# Patient Record
Sex: MALE | Race: WHITE | URBAN - METROPOLITAN AREA
[De-identification: names, ages, dates, MRNs, and addresses within clinical notes are randomized per-mention and may not be internally consistent; named-entity substitution may affect disease eponyms.]

---

## 2024-05-24 PROBLEM — I10 ESSENTIAL HYPERTENSION, BENIGN: Status: ACTIVE | Noted: 2018-02-20

## 2024-05-24 PROBLEM — E55.9 VITAMIN D DEFICIENCY: Status: ACTIVE | Noted: 2018-04-08

## 2024-05-24 PROBLEM — I42.0 DILATED CARDIOMYOPATHY (HCC): Status: ACTIVE | Noted: 2021-04-09

## 2024-05-24 PROBLEM — M25.562 LEFT KNEE PAIN: Status: ACTIVE | Noted: 2019-08-21

## 2024-05-24 PROBLEM — M54.41 ACUTE BILATERAL LOW BACK PAIN WITH BILATERAL SCIATICA: Status: ACTIVE | Noted: 2017-06-13

## 2024-05-24 PROBLEM — M79.18 LUMBAR MUSCLE PAIN: Status: ACTIVE | Noted: 2018-04-08

## 2024-05-24 PROBLEM — M54.42 ACUTE BILATERAL LOW BACK PAIN WITH BILATERAL SCIATICA: Status: ACTIVE | Noted: 2017-06-13

## 2024-05-28 PROBLEM — C43.59 MALIGNANT MELANOMA OF UPPER BACK (HCC): Status: ACTIVE | Noted: 2024-05-28

## 2024-05-28 NOTE — CONSULTS
[facility]        Patient Name:  Mango Hernandez   YOB: 1970   Gender:  Male   Appt Date:  5/29/2024   Provider:  Ian Flor MD     PATIENT PROVIDERS  Referring Provider: FÉLIX Ventura    Primary Care Provider: Liyah Han MD       CHIEF COMPLAINT  New Consult: Melanoma of right upper back     PROBLEMS  Reviewed   Patient Active Problem List   Diagnosis    Acute bilateral low back pain with bilateral sciatica    Lumbar muscle pain    Dilated cardiomyopathy (HCC)    Essential hypertension, benign    Left knee pain    Vitamin D deficiency    Malignant melanoma of upper back (HCC)        History of Present Illness:  Patient is a 53 year old male who is currently being referred for consideration of surgical oncology management of recently diagnosed melanoma of right upper back. He has history of dilated cardiomyopathy, managed by José Rivas MD.       Patient went in to see dermatologist on 04/14/2024, due to lesion on right upper back. He states he has had a mole on his upper back since childhood. His wife noticed his mole was growing for the past 2-3 months. Patient denies itching, bleeding or tenderness. Denies weight loss. Patient does mention some \"bumps\" on the right armpit, states the size of these \"bumps\" fluctuates through out the day. Patient does enjoy outdoor activities. Denies history of sunburns. States he applies sunscreen with activities.     Shave biopsy --> Right Superior Lateral Upper Back: Malignant Melanoma (3.8mm)     Denies any hx of skin cancer. No family hx of skin cancer.        Vital Signs:  /85 (BP Location: Right arm, Patient Position: Sitting, Cuff Size: adult)   Pulse 51   Temp 97.5 °F (36.4 °C) (Temporal)   Resp 20   Ht 1.829 m (6')   Wt 84.8 kg (187 lb)   BMI 25.36 kg/m²      Medications Reviewed:    Current Outpatient Medications:     carvedilol 25 MG Oral Tab, Take 1 tablet (25 mg total) by mouth 2 (two) times  daily with meals., Disp: , Rfl:     lisinopril 10 MG Oral Tab, Take 1 tablet (10 mg total) by mouth daily., Disp: , Rfl:     spironolactone 25 MG Oral Tab, Take 1 tablet (25 mg total) by mouth daily., Disp: , Rfl:     triamcinolone 0.1 % External Cream, Apply 1 Application topically 2 (two) times daily., Disp: , Rfl:      Allergies Reviewed:  Allergies   Allergen Reactions    Simvastatin OTHER (SEE COMMENTS)     Statin myopathy        History:3.  Reviewed:  Past Medical History:    Dilated cardiomyopathy (HCC)    hx of pacemaker    Hypertension    Knee pain    Lumbar pain    Vitamin D deficiency      Reviewed:  Past Surgical History:   Procedure Laterality Date    Pacemaker clinic, cardio (internal)      2005 and 2014. Removed 3/2023      Reviewed Social History:  Social History     Socioeconomic History    Marital status:    Tobacco Use    Smoking status: Former     Types: Cigarettes    Smokeless tobacco: Never   Substance and Sexual Activity    Alcohol use: Yes     Comment: occasionally    Drug use: Yes     Types: Cannabis      Reviewed:  Family History   Problem Relation Age of Onset    Other (lung cancer) Paternal Grandmother     Other (lung cancer) Other       Review of Systems:  GENERAL HEALTH: feels well, no fatigue.   SKIN: + change in mole.   RESPIRATORY: denies shortness of breath  CARDIOVASCULAR: denies chest pain  GI: denies nausea, vomiting, constipation, diarrhea; no rectal bleeding  GENITAL/: no blood in urine  MUSCULOSKELETAL: no joint complaints, no back pain  NEURO: no tingling, numbness, weakness  ENDOCRINE: denies weight loss/gain  PSYCH: no mood changes       Physical Examination:  Constitutional: General Appearance: healthy-appearing, well-nourished, and well-developed. Level of Distress: NAD.   Eyes: Sclera: non-icteric.   Neck: Neck: supple.   Lymph Nodes: Lymph Nodes no cervical LAD, supraclavicular LAD, axillary LAD, or inguinal LAD.   Lungs: Auscultation: breath sounds normal.    Cardiovascular: Heart Auscultation: RRR.   Abdomen: Soft, no masses.  Musculoskeletal: Extremities: no edema.   Skin: The scalp an remainder of the skin do not show any evidence for suspicious lesions. The biopsy site is well-healed.  It is located in the right upper back and measures about 1.5 cm.  There is no evidence for satellite lesions. There are no intransit metastases.     Document Review:  05/14/2024: [facility]   Right Superior Lateral Upper Back (Biopsy by shave method)  A. MALIGNANT MELANOMA (BIOPSY BY SHAVE)   Histiogenic Type: Nodular  Maximum Tumor Thickness: 3.8 mm  Ulceration: Not seen  Peripheral Margin: Free of melanoma  Deep Margin: Free of melanoma  Lymphovascular Invasion: Not seen  Perineural invasion: Not seen  Tumor regression: Not present  AJCC Stage/TNM: pT3a  Mitotic rate: 1/mm2  Comment: there is a background dermal nevus that involves the edges.      Procedure(s):  None     Assessment / Plan:  Malignant melanoma of upper right back (pT3a cN0)  Findings were discussed with patient at length.  His wife was present.  Entity 'melanoma' and its staging discussed.  Patient understands that he will require a wide excision with a 2 cm margin.  In addition, I discussed and recommended sentinel lymph node biopsy.  The surgical treatment matter including indications, rationale, and risks was discussed in detail.  Patient agreed and understood.  Arrangements will be made to schedule the procedure pending medical clearance.  Patient had ample time to ask questions.      Follow Up:  To schedule wide excision, sentinel lymph node biopsy.       Electronically Signed by: Ian Flor MD

## 2024-05-29 ENCOUNTER — OFFICE VISIT (OUTPATIENT)
Dept: SURGERY | Facility: CLINIC | Age: 54
End: 2024-05-29

## 2024-05-29 VITALS
BODY MASS INDEX: 25.33 KG/M2 | DIASTOLIC BLOOD PRESSURE: 85 MMHG | TEMPERATURE: 98 F | HEIGHT: 72 IN | HEART RATE: 51 BPM | RESPIRATION RATE: 20 BRPM | SYSTOLIC BLOOD PRESSURE: 136 MMHG | WEIGHT: 187 LBS

## 2024-05-29 DIAGNOSIS — C43.59 MALIGNANT MELANOMA OF UPPER BACK (HCC): Primary | ICD-10-CM

## 2024-05-29 DIAGNOSIS — I42.0 DILATED CARDIOMYOPATHY (HCC): ICD-10-CM

## 2024-05-29 DIAGNOSIS — Z01.818 PRE-OP TESTING: ICD-10-CM

## 2024-05-29 LAB
ALBUMIN SERPL-MCNC: 4.1 G/DL (ref 3.4–5)
ALBUMIN/GLOB SERPL: 1.3 {RATIO} (ref 1–2)
ALP LIVER SERPL-CCNC: 46 U/L
ALT SERPL-CCNC: 31 U/L
ANION GAP SERPL CALC-SCNC: 4 MMOL/L (ref 0–18)
AST SERPL-CCNC: 23 U/L (ref 15–37)
BASOPHILS # BLD AUTO: 0.03 X10(3) UL (ref 0–0.2)
BASOPHILS NFR BLD AUTO: 0.4 %
BILIRUB SERPL-MCNC: 0.5 MG/DL (ref 0.1–2)
BUN BLD-MCNC: 20 MG/DL (ref 9–23)
CALCIUM BLD-MCNC: 9.1 MG/DL (ref 8.5–10.1)
CHLORIDE SERPL-SCNC: 106 MMOL/L (ref 98–112)
CO2 SERPL-SCNC: 28 MMOL/L (ref 21–32)
CREAT BLD-MCNC: 0.69 MG/DL
EGFRCR SERPLBLD CKD-EPI 2021: 112 ML/MIN/1.73M2 (ref 60–?)
EOSINOPHIL # BLD AUTO: 0.13 X10(3) UL (ref 0–0.7)
EOSINOPHIL NFR BLD AUTO: 1.6 %
ERYTHROCYTE [DISTWIDTH] IN BLOOD BY AUTOMATED COUNT: 12.1 %
FASTING STATUS PATIENT QL REPORTED: NO
GLOBULIN PLAS-MCNC: 3.1 G/DL (ref 2.8–4.4)
GLUCOSE BLD-MCNC: 83 MG/DL (ref 70–99)
HCT VFR BLD AUTO: 39.7 %
HGB BLD-MCNC: 13.6 G/DL
IMM GRANULOCYTES # BLD AUTO: 0.02 X10(3) UL (ref 0–1)
IMM GRANULOCYTES NFR BLD: 0.3 %
LDH SERPL L TO P-CCNC: 167 U/L
LYMPHOCYTES # BLD AUTO: 1.65 X10(3) UL (ref 1–4)
LYMPHOCYTES NFR BLD AUTO: 20.8 %
MCH RBC QN AUTO: 31.4 PG (ref 26–34)
MCHC RBC AUTO-ENTMCNC: 34.3 G/DL (ref 31–37)
MCV RBC AUTO: 91.7 FL
MONOCYTES # BLD AUTO: 0.57 X10(3) UL (ref 0.1–1)
MONOCYTES NFR BLD AUTO: 7.2 %
NEUTROPHILS # BLD AUTO: 5.53 X10 (3) UL (ref 1.5–7.7)
NEUTROPHILS # BLD AUTO: 5.53 X10(3) UL (ref 1.5–7.7)
NEUTROPHILS NFR BLD AUTO: 69.7 %
OSMOLALITY SERPL CALC.SUM OF ELEC: 288 MOSM/KG (ref 275–295)
PLATELET # BLD AUTO: 258 10(3)UL (ref 150–450)
POTASSIUM SERPL-SCNC: 3.9 MMOL/L (ref 3.5–5.1)
PROT SERPL-MCNC: 7.2 G/DL (ref 6.4–8.2)
RBC # BLD AUTO: 4.33 X10(6)UL
SODIUM SERPL-SCNC: 138 MMOL/L (ref 136–145)
WBC # BLD AUTO: 7.9 X10(3) UL (ref 4–11)

## 2024-05-29 PROCEDURE — 83615 LACTATE (LD) (LDH) ENZYME: CPT

## 2024-05-29 PROCEDURE — 80053 COMPREHEN METABOLIC PANEL: CPT

## 2024-05-29 PROCEDURE — 85025 COMPLETE CBC W/AUTO DIFF WBC: CPT | Performed by: SURGERY

## 2024-05-29 PROCEDURE — 99205 OFFICE O/P NEW HI 60 MIN: CPT | Performed by: SURGERY

## 2024-05-29 NOTE — PATIENT INSTRUCTIONS
Surgery:  Wide excision melanoma right upper back with sentinel lymph node biopsy    Date of Surgery: 6/6/24    Hospital:    [address] [address]  Phone: [phone]    This is an Outpatient procedure.  Use the provided Chlorhexadine surgical soap(instructions attached) to shower the night before and morning of your procedure.  Do not apply powders, creams, lotions or deodorant after showering.  Do not apply any kind of makeup and make sure to remove nail polish prior to your surgery.  For faster recovery from anesthesia and surgery please follow the instructions below regarding your pre-op diet:  12 hours prior to your surgery time you are to drink one 10oz bottle of Ensure Pre-Surgery Drink. You are to have NO solid food or water after 11pm the night before your surgery EXCEPT one additional 10oz bottle of Ensure Pre-Surgery Drink. You need to finish drinking this 4 hours prior to surgery time.  Take Tylenol 1000mg by mouth at the time of your second Ensure Pre-Surgery drink(4hrs prior to surgery time), unless instructed otherwise by your surgeon.   Bowel Prep: No   If you take Insulin contact your primary care physician for specific instructions regarding dosing around your surgery.  Do not drink alcohol or smoke tobacco products 24 hours prior to procedure.   Bring your picture ID and insurance card with you.  Wear comfortable clothing that can easily be removed. Preferably, something that zips, snaps, or buttons up the front.   You will be contacted by the hospital  for pre-admission COVID-19 testing and the day prior to your surgery to confirm details and give you specific instructions about when and where to arrive the day of your procedure.   If you are taking blood thinners including: Plavix, Eliquis, Xarelto, Coumadin, full strength Aspirin you will need to contact the prescribing provider for specific instructions on holding these medications for your  procedure.  Inform your primary care physician of your surgery and ask if him/her will need to see you prior to surgery.  If you develop symptoms of another illness prior to surgery please contact our office immediately.   If this is an inpatient surgery, attending the Surgical Oncology Pre-operative Education Class is strongly encouraged. Email [email or web address] to RSVP or for more class information.       Pre-Operative Testing  x CBC done x CMP done  BMP    PT, PTT, INR  UA x EKG    Chest X-Ray x Cardiac Clearance  H & P Medical Clearance     Ian Flor MD, FACS  Chief of Surgical Oncology  Co-Medical Director, Oncology Services  Professor of Surgery    For Dr. Flor's office: [phone]  Fax: [phone]  After hours you will reach the answering service    Pre-Admission [phone]  Central [phone]  Medical Records:   [phone]    Diagnosis: Malignant melanoma of upper right back     SURGEON: Dr. Ian Flor    LOCATION: [facility]    Type: Outpatient    Length of surgery: 1.5 hours  Anesthesia:general  Joint case with:  SA:  No   Special Equipment:   Special request:     Allergies:   Allergies   Allergen Reactions    Simvastatin OTHER (SEE COMMENTS)     Statin myopathy       Orders:  No  -Colon Bundle/Bowel Prep  No  -Type and cross 2 units PRBC  No  -Type and Screen  No  -Advanced Oncology Order Set (HIPEC)  No  -Heparin Pre-Op  Yes-Nuclear Med Injection  No  -Wire localization needed  No  -Midline placement (HIPIC, Whipple, Esophagectomy, Liver)  Yes-Tylenol administration prior to surgery  Does patient have a pacemaker?: No    No  -CHG Cloths (Edcelio)    P.A.T. orders: CBC, CMP, and EKG     For RN use only: Cardiology Clearance

## 2024-05-30 ENCOUNTER — LAB ENCOUNTER (OUTPATIENT)
Dept: LAB | Facility: HOSPITAL | Age: 54
End: 2024-05-30
Attending: SURGERY
Payer: COMMERCIAL

## 2024-05-30 ENCOUNTER — TELEPHONE (OUTPATIENT)
Dept: SURGERY | Facility: CLINIC | Age: 54
End: 2024-05-30

## 2024-05-30 DIAGNOSIS — C43.59 MALIGNANT MELANOMA OF UPPER BACK (HCC): Primary | ICD-10-CM

## 2024-05-30 PROCEDURE — 88321 CONSLTJ&REPRT SLD PREP ELSWR: CPT

## 2024-05-30 NOTE — TELEPHONE ENCOUNTER
Outside pathology of melanoma reviewed by [facility]. Melanoma was upstaged to T3b which increases staging. Patient expressed understanding. Informed that since the lesion is Stage IIB, the recommendations are for PET scan to be performed prior to surgery. He expressed understanding. Order placed in EPIC. Number provided to schedule.     FÉLIX Montes

## 2024-05-31 ENCOUNTER — HOSPITAL ENCOUNTER (OUTPATIENT)
Dept: GENERAL RADIOLOGY | Facility: HOSPITAL | Age: 54
Discharge: HOME | End: 2024-05-31
Payer: COMMERCIAL

## 2024-05-31 DIAGNOSIS — I42.0 DILATED CARDIOMYOPATHY (HCC): ICD-10-CM

## 2024-05-31 DIAGNOSIS — C43.59 MALIGNANT MELANOMA OF UPPER BACK (HCC): ICD-10-CM

## 2024-05-31 PROCEDURE — 71046 X-RAY EXAM CHEST 2 VIEWS: CPT

## 2024-06-05 ENCOUNTER — HOSPITAL ENCOUNTER (OUTPATIENT)
Dept: NUCLEAR MEDICINE | Facility: HOSPITAL | Age: 54
Discharge: HOME | End: 2024-06-05
Payer: COMMERCIAL

## 2024-06-05 ENCOUNTER — TELEPHONE (OUTPATIENT)
Dept: SURGERY | Facility: CLINIC | Age: 54
End: 2024-06-05

## 2024-06-05 DIAGNOSIS — C43.59 MALIGNANT MELANOMA OF UPPER BACK (HCC): ICD-10-CM

## 2024-06-05 LAB — GLUCOSE BLDC GLUCOMTR-MCNC: 114 MG/DL (ref 70–99)

## 2024-06-05 PROCEDURE — 82962 GLUCOSE BLOOD TEST: CPT

## 2024-06-05 PROCEDURE — 78816 PET IMAGE W/CT FULL BODY: CPT

## 2024-06-05 NOTE — TELEPHONE ENCOUNTER
Called patient, discussed PET scan, no evidence of distant disease. Increased uptake in primary melanoma site. OK to proceed with surgery. He expressed understanding.     FÉLIX Montes

## 2024-06-06 ENCOUNTER — HOSPITAL ENCOUNTER (OUTPATIENT)
Dept: NUCLEAR MEDICINE | Facility: HOSPITAL | Age: 54
Discharge: HOME | End: 2024-06-06
Attending: SURGERY
Payer: COMMERCIAL

## 2024-06-06 ENCOUNTER — ANESTHESIA (OUTPATIENT)
Dept: SURGERY | Facility: HOSPITAL | Age: 54
End: 2024-06-06
Payer: COMMERCIAL

## 2024-06-06 ENCOUNTER — ANESTHESIA EVENT (OUTPATIENT)
Dept: SURGERY | Facility: HOSPITAL | Age: 54
End: 2024-06-06
Payer: COMMERCIAL

## 2024-06-06 ENCOUNTER — HOSPITAL ENCOUNTER (OUTPATIENT)
Facility: HOSPITAL | Age: 54
Setting detail: HOSPITAL OUTPATIENT SURGERY
Discharge: HOME | End: 2024-06-06
Attending: SURGERY | Admitting: SURGERY
Payer: COMMERCIAL

## 2024-06-06 VITALS
WEIGHT: 189.63 LBS | RESPIRATION RATE: 18 BRPM | BODY MASS INDEX: 25.68 KG/M2 | SYSTOLIC BLOOD PRESSURE: 142 MMHG | HEART RATE: 55 BPM | HEIGHT: 72 IN | DIASTOLIC BLOOD PRESSURE: 84 MMHG | TEMPERATURE: 98 F | OXYGEN SATURATION: 99 %

## 2024-06-06 DIAGNOSIS — C43.59 MALIGNANT MELANOMA OF UPPER BACK (HCC): ICD-10-CM

## 2024-06-06 PROCEDURE — 78195 LYMPH SYSTEM IMAGING: CPT | Performed by: SURGERY

## 2024-06-06 PROCEDURE — 81210 BRAF GENE: CPT | Performed by: SURGERY

## 2024-06-06 PROCEDURE — 88381 MICRODISSECTION MANUAL: CPT | Performed by: SURGERY

## 2024-06-06 PROCEDURE — 88341 IMHCHEM/IMCYTCHM EA ADD ANTB: CPT | Performed by: SURGERY

## 2024-06-06 PROCEDURE — 0HB6XZZ EXCISION OF BACK SKIN, EXTERNAL APPROACH: ICD-10-PCS | Performed by: SURGERY

## 2024-06-06 PROCEDURE — 07B50ZX EXCISION OF RIGHT AXILLARY LYMPHATIC, OPEN APPROACH, DIAGNOSTIC: ICD-10-PCS | Performed by: SURGERY

## 2024-06-06 PROCEDURE — 88307 TISSUE EXAM BY PATHOLOGIST: CPT | Performed by: SURGERY

## 2024-06-06 PROCEDURE — 88342 IMHCHEM/IMCYTCHM 1ST ANTB: CPT | Performed by: SURGERY

## 2024-06-06 RX ORDER — HYDROMORPHONE HYDROCHLORIDE 1 MG/ML
0.6 INJECTION, SOLUTION INTRAMUSCULAR; INTRAVENOUS; SUBCUTANEOUS EVERY 5 MIN PRN
Status: DISCONTINUED | OUTPATIENT
Start: 2024-06-06 | End: 2024-06-06

## 2024-06-06 RX ORDER — PROCHLORPERAZINE EDISYLATE 5 MG/ML
5 INJECTION INTRAMUSCULAR; INTRAVENOUS EVERY 8 HOURS PRN
Status: DISCONTINUED | OUTPATIENT
Start: 2024-06-06 | End: 2024-06-06

## 2024-06-06 RX ORDER — ONDANSETRON 2 MG/ML
4 INJECTION INTRAMUSCULAR; INTRAVENOUS EVERY 6 HOURS PRN
Status: DISCONTINUED | OUTPATIENT
Start: 2024-06-06 | End: 2024-06-06

## 2024-06-06 RX ORDER — HYDROCODONE BITARTRATE AND ACETAMINOPHEN 5; 325 MG/1; MG/1
2 TABLET ORAL ONCE AS NEEDED
Status: DISCONTINUED | OUTPATIENT
Start: 2024-06-06 | End: 2024-06-06

## 2024-06-06 RX ORDER — HYDROCODONE BITARTRATE AND ACETAMINOPHEN 5; 325 MG/1; MG/1
1 TABLET ORAL ONCE AS NEEDED
Status: DISCONTINUED | OUTPATIENT
Start: 2024-06-06 | End: 2024-06-06

## 2024-06-06 RX ORDER — ROCURONIUM BROMIDE 10 MG/ML
INJECTION, SOLUTION INTRAVENOUS AS NEEDED
Status: DISCONTINUED | OUTPATIENT
Start: 2024-06-06 | End: 2024-06-06 | Stop reason: SURG

## 2024-06-06 RX ORDER — NALOXONE HYDROCHLORIDE 0.4 MG/ML
0.08 INJECTION, SOLUTION INTRAMUSCULAR; INTRAVENOUS; SUBCUTANEOUS AS NEEDED
Status: DISCONTINUED | OUTPATIENT
Start: 2024-06-06 | End: 2024-06-06

## 2024-06-06 RX ORDER — MIDAZOLAM HYDROCHLORIDE 1 MG/ML
INJECTION INTRAMUSCULAR; INTRAVENOUS AS NEEDED
Status: DISCONTINUED | OUTPATIENT
Start: 2024-06-06 | End: 2024-06-06 | Stop reason: SURG

## 2024-06-06 RX ORDER — ONDANSETRON 2 MG/ML
INJECTION INTRAMUSCULAR; INTRAVENOUS AS NEEDED
Status: DISCONTINUED | OUTPATIENT
Start: 2024-06-06 | End: 2024-06-06 | Stop reason: SURG

## 2024-06-06 RX ORDER — DIAZEPAM 5 MG/1
5 TABLET ORAL AS NEEDED
Status: DISCONTINUED | OUTPATIENT
Start: 2024-06-06 | End: 2024-06-06 | Stop reason: HOSPADM

## 2024-06-06 RX ORDER — SODIUM CHLORIDE, SODIUM LACTATE, POTASSIUM CHLORIDE, CALCIUM CHLORIDE 600; 310; 30; 20 MG/100ML; MG/100ML; MG/100ML; MG/100ML
INJECTION, SOLUTION INTRAVENOUS CONTINUOUS
Status: DISCONTINUED | OUTPATIENT
Start: 2024-06-06 | End: 2024-06-06

## 2024-06-06 RX ORDER — DEXAMETHASONE SODIUM PHOSPHATE 4 MG/ML
VIAL (ML) INJECTION AS NEEDED
Status: DISCONTINUED | OUTPATIENT
Start: 2024-06-06 | End: 2024-06-06 | Stop reason: SURG

## 2024-06-06 RX ORDER — HYDROMORPHONE HYDROCHLORIDE 1 MG/ML
0.4 INJECTION, SOLUTION INTRAMUSCULAR; INTRAVENOUS; SUBCUTANEOUS EVERY 5 MIN PRN
Status: DISCONTINUED | OUTPATIENT
Start: 2024-06-06 | End: 2024-06-06

## 2024-06-06 RX ORDER — ONDANSETRON 4 MG/1
4 TABLET, FILM COATED ORAL EVERY 8 HOURS PRN
Qty: 10 TABLET | Refills: 0 | Status: SHIPPED | OUTPATIENT
Start: 2024-06-06 | End: 2024-06-12

## 2024-06-06 RX ORDER — TRAMADOL HYDROCHLORIDE 50 MG/1
50 TABLET ORAL EVERY 6 HOURS PRN
Qty: 20 TABLET | Refills: 0 | Status: SHIPPED | OUTPATIENT
Start: 2024-06-06 | End: 2024-06-12

## 2024-06-06 RX ORDER — ACETAMINOPHEN 500 MG
1000 TABLET ORAL ONCE
Status: DISCONTINUED | OUTPATIENT
Start: 2024-06-06 | End: 2024-06-06 | Stop reason: HOSPADM

## 2024-06-06 RX ORDER — BUPIVACAINE HYDROCHLORIDE 2.5 MG/ML
INJECTION, SOLUTION EPIDURAL; INFILTRATION; INTRACAUDAL AS NEEDED
Status: DISCONTINUED | OUTPATIENT
Start: 2024-06-06 | End: 2024-06-06 | Stop reason: HOSPADM

## 2024-06-06 RX ORDER — LIDOCAINE HYDROCHLORIDE 10 MG/ML
INJECTION, SOLUTION EPIDURAL; INFILTRATION; INTRACAUDAL; PERINEURAL AS NEEDED
Status: DISCONTINUED | OUTPATIENT
Start: 2024-06-06 | End: 2024-06-06 | Stop reason: SURG

## 2024-06-06 RX ORDER — LIDOCAINE AND PRILOCAINE 25; 25 MG/G; MG/G
CREAM TOPICAL ONCE
Status: COMPLETED | OUTPATIENT
Start: 2024-06-06 | End: 2024-06-06

## 2024-06-06 RX ORDER — GLYCOPYRROLATE 0.2 MG/ML
INJECTION, SOLUTION INTRAMUSCULAR; INTRAVENOUS AS NEEDED
Status: DISCONTINUED | OUTPATIENT
Start: 2024-06-06 | End: 2024-06-06 | Stop reason: SURG

## 2024-06-06 RX ORDER — HYDROMORPHONE HYDROCHLORIDE 1 MG/ML
INJECTION, SOLUTION INTRAMUSCULAR; INTRAVENOUS; SUBCUTANEOUS
Status: COMPLETED
Start: 2024-06-06 | End: 2024-06-06

## 2024-06-06 RX ORDER — NEOSTIGMINE METHYLSULFATE 1 MG/ML
INJECTION, SOLUTION INTRAVENOUS AS NEEDED
Status: DISCONTINUED | OUTPATIENT
Start: 2024-06-06 | End: 2024-06-06 | Stop reason: SURG

## 2024-06-06 RX ORDER — HYDROMORPHONE HYDROCHLORIDE 1 MG/ML
0.2 INJECTION, SOLUTION INTRAMUSCULAR; INTRAVENOUS; SUBCUTANEOUS EVERY 5 MIN PRN
Status: DISCONTINUED | OUTPATIENT
Start: 2024-06-06 | End: 2024-06-06

## 2024-06-06 RX ORDER — ACETAMINOPHEN 500 MG
1000 TABLET ORAL ONCE AS NEEDED
Status: DISCONTINUED | OUTPATIENT
Start: 2024-06-06 | End: 2024-06-06

## 2024-06-06 RX ORDER — SCOLOPAMINE TRANSDERMAL SYSTEM 1 MG/1
1 PATCH, EXTENDED RELEASE TRANSDERMAL ONCE
Status: DISCONTINUED | OUTPATIENT
Start: 2024-06-06 | End: 2024-06-06 | Stop reason: HOSPADM

## 2024-06-06 RX ORDER — TRIAMCINOLONE ACETONIDE 1 MG/G
1 CREAM TOPICAL 2 TIMES DAILY
Status: SHIPPED | COMMUNITY
Start: 2024-06-06 | End: 2024-06-12

## 2024-06-06 RX ADMIN — NEOSTIGMINE METHYLSULFATE 4.5 MG: 1 INJECTION, SOLUTION INTRAVENOUS at 13:00:00

## 2024-06-06 RX ADMIN — GLYCOPYRROLATE 0.8 MG: 0.2 INJECTION, SOLUTION INTRAMUSCULAR; INTRAVENOUS at 13:00:00

## 2024-06-06 RX ADMIN — ONDANSETRON 4 MG: 2 INJECTION INTRAMUSCULAR; INTRAVENOUS at 12:55:00

## 2024-06-06 RX ADMIN — LIDOCAINE HYDROCHLORIDE 50 MG: 10 INJECTION, SOLUTION EPIDURAL; INFILTRATION; INTRACAUDAL; PERINEURAL at 11:31:00

## 2024-06-06 RX ADMIN — ROCURONIUM BROMIDE 20 MG: 10 INJECTION, SOLUTION INTRAVENOUS at 11:38:00

## 2024-06-06 RX ADMIN — SODIUM CHLORIDE, SODIUM LACTATE, POTASSIUM CHLORIDE, CALCIUM CHLORIDE: 600; 310; 30; 20 INJECTION, SOLUTION INTRAVENOUS at 13:07:00

## 2024-06-06 RX ADMIN — DEXAMETHASONE SODIUM PHOSPHATE 4 MG: 4 MG/ML VIAL (ML) INJECTION at 11:51:00

## 2024-06-06 RX ADMIN — SODIUM CHLORIDE, SODIUM LACTATE, POTASSIUM CHLORIDE, CALCIUM CHLORIDE: 600; 310; 30; 20 INJECTION, SOLUTION INTRAVENOUS at 11:27:00

## 2024-06-06 RX ADMIN — ROCURONIUM BROMIDE 20 MG: 10 INJECTION, SOLUTION INTRAVENOUS at 11:58:00

## 2024-06-06 RX ADMIN — MIDAZOLAM HYDROCHLORIDE 2 MG: 1 INJECTION INTRAMUSCULAR; INTRAVENOUS at 11:27:00

## 2024-06-06 NOTE — ANESTHESIA PROCEDURE NOTES
Peripheral IV  Date/Time: 6/6/2024 11:45 AM  Inserted by: Pilo Chery MD    Placement  Needle size: 20 G  Laterality: left  Location: forearm  Local anesthetic: general.  Site prep: alcohol  Technique: anatomical landmarks  Attempts: 1

## 2024-06-06 NOTE — ANESTHESIA PREPROCEDURE EVALUATION
PRE-OP EVALUATION    Patient Name: Mango Hernandez    Admit Diagnosis: Malignant melanoma of upper back (HCC) [C43.59]    Pre-op Diagnosis: Malignant melanoma of upper back (HCC) [C43.59]    Wide excision melanoma right upper back with sentinel lymph node biopsy    Anesthesia Procedure: Wide excision melanoma right upper back with sentinel lymph node biopsy (Right)    Surgeons and Role:     * Ian lFor MD - Primary    Pre-op vitals reviewed.  Temp: 97.9 °F (36.6 °C)  Pulse: 61  Resp: 16  BP: 126/73  SpO2: 99 %  Body mass index is 25.71 kg/m².    Current medications reviewed.  Hospital Medications:   [Transfer Hold] acetaminophen (Tylenol Extra Strength) tab 1,000 mg  1,000 mg Oral Once    [Transfer Hold] scopolamine (Transderm-Scop) 1 MG/3DAYS patch 1 patch  1 patch Transdermal Once    lactated ringers infusion   Intravenous Continuous    [Transfer Hold] diazePAM (Valium) tab 5 mg  5 mg Oral PRN    [COMPLETED] lidocaine-prilocaine (Emla) 2.5-2.5 % cream   Topical Once    ceFAZolin (Ancef) 2g in 10mL IV syringe premix  2 g Intravenous Once       Outpatient Medications:     Medications Prior to Admission   Medication Sig Dispense Refill Last Dose    carvedilol 25 MG Oral Tab Take 1 tablet (25 mg total) by mouth 2 (two) times daily with meals.   6/5/2024    lisinopril 10 MG Oral Tab Take 1 tablet (10 mg total) by mouth daily.   6/5/2024    spironolactone 25 MG Oral Tab Take 1 tablet (25 mg total) by mouth daily.   6/5/2024    triamcinolone 0.1 % External Cream Apply 1 Application topically 2 (two) times daily.          Allergies: Simvastatin      Anesthesia Evaluation    Patient summary reviewed.    Anesthetic Complications  (-) history of anesthetic complications         GI/Hepatic/Renal                                 Cardiovascular      ECG reviewed.            (+) hypertension                     (+) CHF                Endo/Other                                  Pulmonary                            Neuro/Psych                              EF normal per cardiologist. ICD removed previously        Past Surgical History:   Procedure Laterality Date    Colonoscopy      Pacemaker clinic, cardio (internal)      2005 and 2014. Removed 3/2023     Social History     Socioeconomic History    Marital status:    Tobacco Use    Smoking status: Former     Types: Cigarettes    Smokeless tobacco: Never    Tobacco comments:     Quit 2005   Vaping Use    Vaping status: Former   Substance and Sexual Activity    Alcohol use: Not Currently     Comment: occasionally    Drug use: Yes     Types: Cannabis     Comment: 5-7 to sleep     History   Drug Use    Types: Cannabis     Comment: 5-7 to sleep     Available pre-op labs reviewed.  Lab Results   Component Value Date    WBC 7.9 05/29/2024    RBC 4.33 05/29/2024    HGB 13.6 05/29/2024    HCT 39.7 05/29/2024    MCV 91.7 05/29/2024    MCH 31.4 05/29/2024    MCHC 34.3 05/29/2024    RDW 12.1 05/29/2024    .0 05/29/2024     Lab Results   Component Value Date     05/29/2024    K 3.9 05/29/2024     05/29/2024    CO2 28.0 05/29/2024    BUN 20 05/29/2024    CREATSERUM 0.69 (L) 05/29/2024    GLU 83 05/29/2024    CA 9.1 05/29/2024            Airway      Mallampati: II  Mouth opening: >3 FB  TM distance: 4 - 6 cm  Neck ROM: full Cardiovascular    Cardiovascular exam normal.         Dental  Comment: Dentition appears grossly intact. Patient denies any loose, chipped or missing teeth other than as noted. Risks of dental trauma related to anesthesia including intubation and during emergence explained.             Pulmonary    Pulmonary exam normal.                 Other findings              ASA: 3   Plan: general  NPO status verified and patient meets guidelines.    Post-procedure pain management plan discussed with surgeon and patient.    Comment: Options, risks and benefits of anesthesia as outlined in the anesthesia consent were reviewed with the patient. Risks and  benefits of GA including sore throat, allergy, nausea, vomiting, dental trauma, pain management modalities were all discussed. Particularly the risk of dental trauma with weakened teeth or crowns, partials, fillings and any non natural teeth due to instrumentation of oral cavity and airway. Patient understands risks and verbally agreed to proceed. All questions answered.The consent was signed without further questions.      Plan/risks discussed with: patient and spouse                Present on Admission:  **None**

## 2024-06-06 NOTE — BRIEF OP NOTE
Pre-Operative Diagnosis: Malignant melanoma of upper back (HCC) [C43.59]     Post-Operative Diagnosis: Malignant melanoma of upper back (HCC) [C43.59]      Procedure Performed:   Wide excision melanoma right upper back with sentinel lymph node biopsy of right axilla    Surgeons and Role:     * Ian Flor MD - Primary    Assistant(s):  PA: Anca Powers PA     Surgical Findings: See Op Note     Specimen: yes     Estimated Blood Loss: Blood Output: 5 mL (6/6/2024 12:57 PM)      FÉLIX Morelos  6/6/2024  1:11 PM

## 2024-06-06 NOTE — DISCHARGE SUMMARY
Wide Excision of Melanoma Discharge Instructions  Thank you for choosing the Surgical Oncology team at [facility].        Managing Your Pain  Follow the guidelines below to help manage your pain at home:  Take your medications as directed and as needed. You may also take 1000 mg of acetaminophen (Tylenol®) every 6 hours as needed for pain.  Call our office if the medication prescribed for you does not ease your pain.  Don't drive or drink alcohol while you're taking prescription pain medications.  Keep track of when you take your pain medication. It works best 30 to 45 minutes after you take it.   Caring for Your Incision  It's normal for the skin below your incision to feel numb. This happens because some of the nerves were cut during your surgery. The numbness will go away over time.  Leave the dressing on for 48 hours after surgery. The clear outer covering (tegaderm) can then come off.   Your surgeon used dissolvable sutures (stitches) underneath your skin, and they will not need to be removed.   The Steri-Strips (small white adhesive strips) on your incision will loosen and fall off by themselves. If they haven't fallen off within 14 days, you can take them off.  If the area around your incision is red, puffy, or if you have any drainage from your incision, contact our office.  Showering  You may shower 48 hours after surgery. When you shower, use soap to gently wash your incision. After you shower, pat the area dry with a clean towel. Don't rub over your incision. Leave your incision uncovered, unless there's drainage.  Avoid tub baths until your surgeon says it's okay.  Activity  After the procedure, take it easy for the rest of the day.  If you had general anesthesia, don't use machinery or power tools, drink alcohol, or make any major decisions for at least the first 24 hours.  Avoid lifting more than 10 pounds with your affected arm for at least 4 weeks  Avoid rigorous movement with right arm  for at least 4 weeks     When to Call:  Contact our office if you experience the following symptoms:  Fever of 100.4° F (38°C) or higher  Cloudy or smelly drainage from the incision site  The skin around your incision is warm/red/swollen  Sudden increase in pain or new pain  Shaking chills  Fast pulse  Shortness of breath or chest pain  Nausea or vomiting   Diarrhea  Constipation that isn't relieved in 2 days  Any new or unexplained symptoms    We will call you to set up a follow-up appointment.    Please arrive at the following location:  [facility]: [address] [address]  Archana WALKER [facility]: [address] [place] [address]  Please call us at [phone] if you are unable to make it to your appointment or if you have any questions.

## 2024-06-06 NOTE — ANESTHESIA POSTPROCEDURE EVALUATION
[facility]    Mango Hernandez Patient Status:  Hospital Outpatient Surgery   Age/Gender 53 year old male MRN [id number]   Location [facility] POST ANESTHESIA CARE UNIT Attending Ian Flor MD   Hosp Day # 0 PCP No primary care provider on file.       Anesthesia Post-op Note    Wide excision melanoma right upper back with sentinel lymph node biopsy of right axilla    Procedure Summary       Date: 06/06/24 Room / Location:  MAIN OR 12 /  MAIN OR    Anesthesia Start: 1127 Anesthesia Stop: 1315    Procedure: Wide excision melanoma right upper back with sentinel lymph node biopsy of right axilla (Right: Axilla) Diagnosis:       Malignant melanoma of upper back (HCC)      (Malignant melanoma of upper back (HCC) [C43.59])    Surgeons: Ian Flor MD Anesthesiologist: Pilo Chery MD    Anesthesia Type: general ASA Status: 3            Anesthesia Type: general    Vitals Value Taken Time   /84 06/06/24 1318   Temp 98.2 °F (36.8 °C) 06/06/24 1315   Pulse 51 06/06/24 1320   Resp 15 06/06/24 1320   SpO2 99 % 06/06/24 1320   Vitals shown include unfiled device data.    Patient Location: PACU    Anesthesia Type: general    Airway Patency: patent    Postop Pain Control: adequate    Mental Status: preanesthetic baseline    Nausea/Vomiting: none    Cardiopulmonary/Hydration status: stable euvolemic    Complications: no apparent anesthesia related complications    Postop vital signs: stable    Dental Exam: Unchanged from Preop    Patient to be discharged from PACU when criteria met.

## 2024-06-06 NOTE — INTERVAL H&P NOTE
There has been no significant change since Dr Flor saw patient as documented in EPIC. PET with no evidence of distant metastasis.   Surgery revisted.   To proceed as planned.    Patient seen and examined with FÉLIX Galeana

## 2024-06-06 NOTE — DISCHARGE INSTRUCTIONS
Wide Excision of Melanoma Discharge Instructions  Thank you for choosing the Surgical Oncology team at [facility].        Managing Your Pain  Follow the guidelines below to help manage your pain at home:  Take your medications as directed and as needed. You may also take 1000 mg of acetaminophen (Tylenol®) every 6 hours as needed for pain.  Call our office if the medication prescribed for you does not ease your pain.  Don't drive or drink alcohol while you're taking prescription pain medications.  Keep track of when you take your pain medication. It works best 30 to 45 minutes after you take it.   Caring for Your Incision  It's normal for the skin below your incision to feel numb. This happens because some of the nerves were cut during your surgery. The numbness will go away over time.  Leave the dressing on for 48 hours after surgery. The clear outer covering (tegaderm) can then come off.   Your surgeon used dissolvable sutures (stitches) underneath your skin, and they will not need to be removed.   The Steri-Strips (small white adhesive strips) on your incision will loosen and fall off by themselves. If they haven't fallen off within 14 days, you can take them off.  If the area around your incision is red, puffy, or if you have any drainage from your incision, contact our office.  Showering  You may shower 48 hours after surgery. When you shower, use soap to gently wash your incision. After you shower, pat the area dry with a clean towel. Don't rub over your incision. Leave your incision uncovered, unless there's drainage.  Avoid tub baths until your surgeon says it's okay.  Activity  After the procedure, take it easy for the rest of the day.  If you had general anesthesia, don't use machinery or power tools, drink alcohol, or make any major decisions for at least the first 24 hours.  Avoid lifting more than 10 pounds with your affected arm for at least 4 weeks  Avoid rigorous movement with right arm  for at least 4 weeks     When to Call:  Contact our office if you experience the following symptoms:  Fever of 100.4° F (38°C) or higher  Cloudy or smelly drainage from the incision site  The skin around your incision is warm/red/swollen  Sudden increase in pain or new pain  Shaking chills  Fast pulse  Shortness of breath or chest pain  Nausea or vomiting   Diarrhea  Constipation that isn't relieved in 2 days  Any new or unexplained symptoms    We will call you to set up a follow-up appointment.    Please arrive at the following location:  [facility]: [address] [address]  Archana WALKER [facility]: [address] [place] [address]  Please call us at [phone] if you are unable to make it to your appointment or if you have any questions.

## 2024-06-06 NOTE — ANESTHESIA PROCEDURE NOTES
Airway  Date/Time: 6/6/2024 11:33 AM  Urgency: elective    Airway not difficult    General Information and Staff    Patient location during procedure: OR  Anesthesiologist: Pilo Chery MD  Performed: anesthesiologist   Performed by: Pilo Chery MD  Authorized by: Pilo Chery MD      Indications and Patient Condition  Indications for airway management: anesthesia  Spontaneous Ventilation: absent  Sedation level: deep  Preoxygenated: yes  Patient position: sniffing  Mask difficulty assessment: 1 - vent by mask    Final Airway Details  Final airway type: endotracheal airway      Successful airway: ETT  Cuffed: yes   Successful intubation technique: direct laryngoscopy  Facilitating devices/methods: intubating stylet  Endotracheal tube insertion site: oral  Blade: Katheryn  Blade size: #3  ETT size (mm): 7.5    Cormack-Lehane Classification: grade I - full view of glottis  Placement verified by: capnometry   Cuff volume (mL): 8  Measured from: lips  ETT to lips (cm): 22  Number of attempts at approach: 1

## 2024-06-07 ENCOUNTER — TELEPHONE (OUTPATIENT)
Dept: SURGERY | Facility: CLINIC | Age: 54
End: 2024-06-07

## 2024-06-07 ENCOUNTER — OFFICE VISIT (OUTPATIENT)
Dept: SURGERY | Facility: CLINIC | Age: 54
End: 2024-06-07
Payer: COMMERCIAL

## 2024-06-07 VITALS
TEMPERATURE: 98 F | HEART RATE: 58 BPM | DIASTOLIC BLOOD PRESSURE: 79 MMHG | SYSTOLIC BLOOD PRESSURE: 138 MMHG | OXYGEN SATURATION: 97 %

## 2024-06-07 DIAGNOSIS — C43.59 MALIGNANT MELANOMA OF UPPER BACK (HCC): Primary | ICD-10-CM

## 2024-06-07 DIAGNOSIS — T14.8XXA HEMATOMA: ICD-10-CM

## 2024-06-07 NOTE — TELEPHONE ENCOUNTER
Called patient to follow-up from todays visit. He is doing okay, in much less pain than in office this morning. There is approximately 85cc that is emptied from ASIF drain, since his visit today. Patient aware of return precautions.

## 2024-06-07 NOTE — TELEPHONE ENCOUNTER
Pt called answering service and left message.  I returned call and patient has severe swelling at surgical site.  Patient instructed to come to office for evaluation.

## 2024-06-07 NOTE — PROGRESS NOTES
[facility] Surgical Oncology      Patient Name:  Mango Hernandez   YOB: 1970   Gender:  Male   Appt Date:  6/7/2024   Provider:  Ian Flor MD     PATIENT PROVIDERS  Referring Provider: FÉLIX Ventura     Primary Care Provider: Liyah Han MD       CHIEF COMPLAINT  Chief Complaint   Patient presents with    Post-Op        PROBLEMS  Reviewed   Patient Active Problem List   Diagnosis    Acute bilateral low back pain with bilateral sciatica    Lumbar muscle pain    Dilated cardiomyopathy (HCC)    Essential hypertension, benign    Left knee pain    Vitamin D deficiency    Malignant melanoma of upper back (HCC)    Hematoma        History of Present Illness:  Malignant Melanoma of the Right Upper Back    Patient went in to see dermatologist on 04/14/2024, due to lesion on right upper back. He states he has had a mole on his upper back since childhood. His wife noticed his mole was growing for the past 2-3 months. Patient denies itching, bleeding or tenderness. Denies weight loss. Patient does mention some \"bumps\" on the right armpit, states the size of these \"bumps\" fluctuates through out the day. Patient does enjoy outdoor activities. Denies history of sunburns. States he applies sunscreen with activities.      Shave biopsy --> Right Superior Lateral Upper Back: Malignant Melanoma (3.8mm)     Interval History:  6/6/2024: s/p wide excision with sentinel lymph node biopsy    Patient presents with swelling of incision sites.  This happened this morning and thus he called us.  There is no precipitating factor.     Vital Signs:  /79 (BP Location: Left arm, Patient Position: Sitting)   Pulse 58   Temp 98.1 °F (36.7 °C)   SpO2 97%      Medications Reviewed:    Current Outpatient Medications:     ondansetron (ZOFRAN) 4 mg tablet, Take 1 tablet (4 mg total) by mouth every 8 (eight) hours as needed for Nausea., Disp: 10 tablet, Rfl: 0    triamcinolone 0.1 % External Cream, Apply 1  Application topically 2 (two) times daily. Do not apply to incision sites, Disp: , Rfl:     traMADol 50 MG Oral Tab, Take 1 tablet (50 mg total) by mouth every 6 (six) hours as needed for Pain., Disp: 20 tablet, Rfl: 0    carvedilol 25 MG Oral Tab, Take 1 tablet (25 mg total) by mouth 2 (two) times daily with meals., Disp: , Rfl:     lisinopril 10 MG Oral Tab, Take 1 tablet (10 mg total) by mouth daily., Disp: , Rfl:     spironolactone 25 MG Oral Tab, Take 1 tablet (25 mg total) by mouth daily., Disp: , Rfl:      Allergies Reviewed:  Allergies   Allergen Reactions    Simvastatin OTHER (SEE COMMENTS)     Statin myopathy        History:  Reviewed:  Past Medical History:    Cardiomyopathy (HCC)    Congestive heart disease (HCC)    Dilated cardiomyopathy (HCC)    hx of pacemaker    High blood pressure    Hypertension    Knee pain    Lumbar pain    Visual impairment    Vitamin D deficiency      Reviewed:  Past Surgical History:   Procedure Laterality Date    Colonoscopy      Pacemaker clinic, cardio (internal)      2005 and 2014. Removed 3/2023      Reviewed Social History:  Social History     Socioeconomic History    Marital status:    Tobacco Use    Smoking status: Former     Types: Cigarettes    Smokeless tobacco: Never    Tobacco comments:     Quit 2005   Vaping Use    Vaping status: Former   Substance and Sexual Activity    Alcohol use: Not Currently     Comment: occasionally    Drug use: Yes     Types: Cannabis     Comment: 5-7 to sleep      Reviewed:  Family History   Problem Relation Age of Onset    Other (lung cancer) Paternal Grandmother     Other (lung cancer) Other       Review of Systems:  Doing well post-operatively  Denies fever or chills  Denies chest pain or SOB  Denies abdominal pain or N/V  Denies dysuria or hematuria  Denies warmth, erythema, or drainage at incision       Physical Examination:  Surgical site with swelling consistent with hematoma.     Document Review:  None      Procedure(s):  Consent obtained.  1% lidocaine was used as local anesthetic.  1.  Attempted aspiration obtained about 4 mL of dark blood.  2.  Portion of inferior aspect of incision opened up.  About 40 mL of dark blood drained evacuating patient's postoperative hematoma.  Wound irrigated.  A 15 Jignesh drain was subsequently placed and stitched into place using 3-0 nylon suture.  Portion of incision that was opened up was reapproximated with 4-0 nylon sutures placed in an interrupted simple fashion.  Site redressed to include pressure dressing.  Patient monitored with minimal output.     Assessment / Plan:  Malignant melanoma of upper back (HCC) (C43.59)  -Postop day 1 status post wide excision, send lymph node biopsy  -Postoperative hematoma drained as above.  -Instructions and follow-up discussed.      Follow Up:  Next week       Electronically Signed by: Ian Flor MD

## 2024-06-07 NOTE — OPERATIVE REPORT
[facility]    PATIENT'S NAME: PAOLA NUNEZ   ATTENDING PHYSICIAN: Ian Flor MD   OPERATING PHYSICIAN: Ian Flor MD   PATIENT ACCOUNT#:   [id number]    LOCATION:  [facility] 4 ED 10  MEDICAL RECORD #:   [id number]       YOB: 1970  ADMISSION DATE:       06/06/2024      OPERATION DATE:  06/06/2024    OPERATIVE REPORT    PREOPERATIVE DIAGNOSIS:  Malignant melanoma, right upper back.    POSTOPERATIVE DIAGNOSIS:  Malignant melanoma, right upper back.    PROCEDURE:  1.   Wide excision of malignant melanoma, right upper back, 6 cm.  2.   Bison lymph node biopsy, right axilla.  3.   Complex closure, 12 cm.    ASSISTANT:  Jenae Powers PA-C    ANESTHESIA:  General.    INDICATIONS:  The patient is a 53-year-old man who was diagnosed with an ulcerated 3.8 mm thick melanoma, T3b, clinically N0.  He presents today for wide excision and sentinel lymph node biopsy.  He presented to the nuclear medicine department where he underwent injection of radiolabeled colloid, followed by lymphoscintigraphy showing uptake within the right axilla.    OPERATIVE TECHNIQUE:  Patient was brought to the operating room and was placed in supine position.  He was given general anesthesia by the anesthesiology service.  Sequential compression boots were placed.  Patient received preoperative intravenous antibiotic prophylaxis.  He was prepped and draped in the normal sterile fashion after being placed in a mild lateral position.  A right axillary incision overlying hot transcutaneous site was made and deepened.  I encountered an enlarged, slightly pigmented lymph node with increased uptake by the gamma probe to 5117/10 seconds.  This was dissected free of surrounding tissue and had an ex vivo count of 3061/10 seconds.  It was sent to Pathology for permanent section evaluation.  Background count was 71.  There was no evidence for any other enlarged lymph nodes or any other nodes with increased  uptake by the gamma probe.  The wound was infiltrated with 0.25% Marcaine and closed in layers using 3-0 and 4-0 Vicryl sutures.  Steri-Strips with Telfa and Tegaderm dressing was applied.    Next, a transverse oblique elliptical incision measuring 6 x 12 cm around the biopsy scar of the right upper back was made and deepened.  Circumferential dissection was carried out, and the skin and soft tissue were excised at to the level of the fascia.  The specimen was excised in toto, oriented, and sent to Pathology for permanent section evaluation.  To allow for primary closure without tension, flaps were raised and undermined anteriorly and posteriorly, and layered closure was then attained using 2-0 Vicryl sutures and 4-0 Vicryl sutures.  Steri-Strips with Telfa and Tegaderm dressings were then applied.    The patient tolerated the procedure well without immediate complications.  He was extubated in the operating room and was sent in stable condition to recovery room.  Counts were correct.  I was present throughout the procedure.    Wide Local Excision for Primary Cutaneous Melanoma - Excision 1 (Back)  Operation performed with curative intent Yes   Original Breslow thickness of the lesion  3.8 mm (to the tenth of a millimeter)   Clinical margin width  (measured from the edge of the lesion or the prior excision scar) 2 cm   Depth of excision Full-thickness skin/subcutaneous tissue down to fascia (melanoma)       Dictated By Ian Flor MD  d: 06/06/2024 13:04:40  t: 06/06/2024 19:41:03  Job [id number]  [facility]/

## 2024-06-10 ENCOUNTER — OFFICE VISIT (OUTPATIENT)
Dept: SURGERY | Facility: CLINIC | Age: 54
End: 2024-06-10
Payer: COMMERCIAL

## 2024-06-10 VITALS
TEMPERATURE: 98 F | SYSTOLIC BLOOD PRESSURE: 133 MMHG | DIASTOLIC BLOOD PRESSURE: 86 MMHG | HEART RATE: 55 BPM | WEIGHT: 185.38 LBS | BODY MASS INDEX: 25 KG/M2 | RESPIRATION RATE: 20 BRPM

## 2024-06-10 DIAGNOSIS — C43.59 MALIGNANT MELANOMA OF UPPER BACK (HCC): Primary | ICD-10-CM

## 2024-06-10 DIAGNOSIS — T14.8XXA HEMATOMA: ICD-10-CM

## 2024-06-10 DIAGNOSIS — C77.9 MALIGNANT MELANOMA METASTATIC TO LYMPH NODE (HCC): ICD-10-CM

## 2024-06-10 PROCEDURE — 99024 POSTOP FOLLOW-UP VISIT: CPT

## 2024-06-10 NOTE — PROGRESS NOTES
[facility] Surgical Oncology      Patient Name:  Mango Hernandez   YOB: 1970   Gender:  Male   Appt Date:  6/10/2024   Provider:  FÉLIX Montes     PATIENT PROVIDERS  Referring Provider: FÉLIX Ventura     Primary Care Provider: Liyah Han MD       CHIEF COMPLAINT  Chief Complaint   Patient presents with    Post-Op        PROBLEMS  Reviewed   Patient Active Problem List   Diagnosis    Acute bilateral low back pain with bilateral sciatica    Lumbar muscle pain    Dilated cardiomyopathy (HCC)    Essential hypertension, benign    Left knee pain    Vitamin D deficiency    Malignant melanoma of upper back (HCC)    Hematoma        History of Present Illness:  Malignant Melanoma of the Right Upper Back     Patient went in to see dermatologist on 04/14/2024, due to lesion on right upper back. He states he has had a mole on his upper back since childhood. His wife noticed his mole was growing for the past 2-3 months. Patient denies itching, bleeding or tenderness. Denies weight loss. Patient does mention some \"bumps\" on the right armpit, states the size of these \"bumps\" fluctuates through out the day. Patient does enjoy outdoor activities. Denies history of sunburns. States he applies sunscreen with activities.      Shave biopsy --> Right Superior Lateral Upper Back: Malignant Melanoma (3.8mm)      Interval History:  6/6/2024: s/p wide excision with sentinel lymph node biopsy --> 3.8 mm ulcerated, 1/1 lymph node positive for melanoma    6/7/2024: Post op hematoma, I&D with drain placement    Drain output has decreased to 10 mL per day with sanguinous output. He denies pain. Swelling has improved. He continues to use ACE for compression.      Vital Signs:  /86 (BP Location: Left arm, Patient Position: Sitting, Cuff Size: adult)   Pulse 55   Temp 98 °F (36.7 °C) (Temporal)   Resp 20   Wt 84.1 kg (185 lb 6.4 oz)   BMI 25.14 kg/m²      Medications Reviewed:    Current  Outpatient Medications:     ondansetron (ZOFRAN) 4 mg tablet, Take 1 tablet (4 mg total) by mouth every 8 (eight) hours as needed for Nausea., Disp: 10 tablet, Rfl: 0    triamcinolone 0.1 % External Cream, Apply 1 Application topically 2 (two) times daily. Do not apply to incision sites, Disp: , Rfl:     traMADol 50 MG Oral Tab, Take 1 tablet (50 mg total) by mouth every 6 (six) hours as needed for Pain., Disp: 20 tablet, Rfl: 0    carvedilol 25 MG Oral Tab, Take 1 tablet (25 mg total) by mouth 2 (two) times daily with meals., Disp: , Rfl:     lisinopril 10 MG Oral Tab, Take 1 tablet (10 mg total) by mouth daily., Disp: , Rfl:     spironolactone 25 MG Oral Tab, Take 1 tablet (25 mg total) by mouth daily., Disp: , Rfl:      Allergies Reviewed:  Allergies   Allergen Reactions    Simvastatin OTHER (SEE COMMENTS)     Statin myopathy        History:  Reviewed:  Past Medical History:    Cardiomyopathy (HCC)    Congestive heart disease (HCC)    Dilated cardiomyopathy (HCC)    hx of pacemaker    High blood pressure    Hypertension    Knee pain    Lumbar pain    Visual impairment    Vitamin D deficiency      Reviewed:  Past Surgical History:   Procedure Laterality Date    Colonoscopy      Other surgical history Right     Wide excision with sentinel node (R upper back)    Pacemaker clinic, cardio (internal)      2005 and 2014. Removed 3/2023      Reviewed Social History:  Social History     Socioeconomic History    Marital status:    Tobacco Use    Smoking status: Former     Types: Cigarettes    Smokeless tobacco: Never    Tobacco comments:     Quit 2005   Vaping Use    Vaping status: Former   Substance and Sexual Activity    Alcohol use: Not Currently     Comment: occasionally    Drug use: Yes     Types: Cannabis     Comment: 5-7 to sleep      Reviewed:  Family History   Problem Relation Age of Onset    Other (lung cancer) Paternal Grandmother     Other (lung cancer) Other       Review of Systems:  Doing well  post-operatively  Denies fever or chills  Denies chest pain or SOB  Denies abdominal pain or N/V  Denies warmth, erythema, or drainage at incision       Physical Examination:  Constitutional:  NAD.   Eyes: non-icteric.    Abdomen: Non-distended  Musculoskeletal: no lower extremity edema  Skin: Right axillary incision site healing well. Dressing removed. Small blister along tape site. Dressed with bandaid. Right upper back incision site healing well. Drain with minimal sanguinous output. Hematoma very much improved compared to prior. Incision healing well without drainage or erythema. Sutures in place.      Document Review:  Final Diagnosis:   A.  Right axilla sentinel lymph node #1, excision:  -Metastatic melanoma involving 1 lymph node (1/1).  -Largest metastatic deposit measures 5 mm in greatest dimension.  -No evidence of extracapsular extension.     B.  Skin, right upper back, excision:  -Healing wound, consistent with prior procedure, completely excised.  -No residual malignancy.  -Focal residual intradermal nevus adjacent to scar.        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 Significant improvement in size of hematoma. Drain removed today. Covered with gauze and tape.   - Pathology reviewed with patient and wide. Melanoma completely excised. 1/1 lymph node positive for melanoma. PET scan done pre-operatively. Will refer patient to medical oncology at [facility] for consideration of immunotherapy. They expressed understanding.   - Discussed signs and symptoms to return to clinic or call the office  - Review incision care  - Will have patient return in 1 week for suture removal      Follow Up:  1 week, suture removal       Electronically Signed by: Anca Powers  PA

## 2024-06-12 ENCOUNTER — OFFICE VISIT (OUTPATIENT)
Dept: HEMATOLOGY/ONCOLOGY | Age: 54
End: 2024-06-12
Attending: SPECIALIST
Payer: COMMERCIAL

## 2024-06-12 VITALS
OXYGEN SATURATION: 99 % | SYSTOLIC BLOOD PRESSURE: 125 MMHG | BODY MASS INDEX: 25.7 KG/M2 | DIASTOLIC BLOOD PRESSURE: 72 MMHG | HEIGHT: 71.1 IN | TEMPERATURE: 97 F | RESPIRATION RATE: 18 BRPM | HEART RATE: 55 BPM | WEIGHT: 185.63 LBS

## 2024-06-12 DIAGNOSIS — C77.3 SECONDARY MALIGNANT NEOPLASM OF AXILLARY LYMPH NODES (HCC): ICD-10-CM

## 2024-06-12 DIAGNOSIS — C43.59 MALIGNANT MELANOMA OF UPPER BACK (HCC): Primary | ICD-10-CM

## 2024-06-12 LAB
ALBUMIN SERPL-MCNC: 3.9 G/DL (ref 3.4–5)
ALBUMIN/GLOB SERPL: 1.2 {RATIO} (ref 1–2)
ALP LIVER SERPL-CCNC: 46 U/L
ALT SERPL-CCNC: 22 U/L
ANION GAP SERPL CALC-SCNC: 3 MMOL/L (ref 0–18)
AST SERPL-CCNC: 18 U/L (ref 15–37)
BASOPHILS # BLD AUTO: 0.03 X10(3) UL (ref 0–0.2)
BASOPHILS NFR BLD AUTO: 0.3 %
BILIRUB SERPL-MCNC: 0.4 MG/DL (ref 0.1–2)
BUN BLD-MCNC: 20 MG/DL (ref 9–23)
CALCIUM BLD-MCNC: 9.4 MG/DL (ref 8.5–10.1)
CHLORIDE SERPL-SCNC: 102 MMOL/L (ref 98–112)
CO2 SERPL-SCNC: 31 MMOL/L (ref 21–32)
CREAT BLD-MCNC: 0.67 MG/DL
EGFRCR SERPLBLD CKD-EPI 2021: 113 ML/MIN/1.73M2 (ref 60–?)
EOSINOPHIL # BLD AUTO: 0.2 X10(3) UL (ref 0–0.7)
EOSINOPHIL NFR BLD AUTO: 2.1 %
ERYTHROCYTE [DISTWIDTH] IN BLOOD BY AUTOMATED COUNT: 12.3 %
FASTING STATUS PATIENT QL REPORTED: NO
GLOBULIN PLAS-MCNC: 3.2 G/DL (ref 2.8–4.4)
GLUCOSE BLD-MCNC: 92 MG/DL (ref 70–99)
HCT VFR BLD AUTO: 37.6 %
HGB BLD-MCNC: 12.8 G/DL
IMM GRANULOCYTES # BLD AUTO: 0.04 X10(3) UL (ref 0–1)
IMM GRANULOCYTES NFR BLD: 0.4 %
LYMPHOCYTES # BLD AUTO: 1.82 X10(3) UL (ref 1–4)
LYMPHOCYTES NFR BLD AUTO: 19.5 %
MCH RBC QN AUTO: 31.7 PG (ref 26–34)
MCHC RBC AUTO-ENTMCNC: 34 G/DL (ref 31–37)
MCV RBC AUTO: 93.1 FL
MONOCYTES # BLD AUTO: 0.59 X10(3) UL (ref 0.1–1)
MONOCYTES NFR BLD AUTO: 6.3 %
NEUTROPHILS # BLD AUTO: 6.63 X10 (3) UL (ref 1.5–7.7)
NEUTROPHILS # BLD AUTO: 6.63 X10(3) UL (ref 1.5–7.7)
NEUTROPHILS NFR BLD AUTO: 71.4 %
OSMOLALITY SERPL CALC.SUM OF ELEC: 284 MOSM/KG (ref 275–295)
PLATELET # BLD AUTO: 282 10(3)UL (ref 150–450)
POTASSIUM SERPL-SCNC: 4.2 MMOL/L (ref 3.5–5.1)
PROT SERPL-MCNC: 7.1 G/DL (ref 6.4–8.2)
RBC # BLD AUTO: 4.04 X10(6)UL
SODIUM SERPL-SCNC: 136 MMOL/L (ref 136–145)
T4 FREE SERPL-MCNC: 0.9 NG/DL (ref 0.8–1.7)
TSI SER-ACNC: 1.06 MIU/ML (ref 0.36–3.74)
WBC # BLD AUTO: 9.3 X10(3) UL (ref 4–11)

## 2024-06-12 PROCEDURE — 99205 OFFICE O/P NEW HI 60 MIN: CPT | Performed by: SPECIALIST

## 2024-06-12 NOTE — PROGRESS NOTES
Patient is here for MD consult for Melanoma. Wide excision with right LN dissection performed on 6/6. Here to discuss next steps.       Education Record    Learner:  Patient    Disease / Diagnosis:  consult     Barriers / Limitations:  None   Comments:    Method:  Discussion   Comments:    General Topics:  Plan of care reviewed   Comments:    Outcome:  Shows understanding   Comments:

## 2024-06-12 NOTE — PROGRESS NOTES
[facility] Consultation Note      Patient Name: Mango Hernandez   YOB: 1970  Medical Record Number: [id number]  Consulting Physician: Lacho Hodge M.D.   Referring Physician: Ian Flor M.D.    The 21st Century Cures Act makes medical notes like these available to patients in the interest of transparency. Please be advised this is a medical document. Medical documents are intended to carry relevant information, facts as evident, and the clinical opinion of the practitioner. The medical note is intended as peer to peer communication and may appear blunt or direct. It is written in medical language and may contain abbreviations or verbiage that are unfamiliar.      Date of Consultation: 6/12/2024      Reason for Consultation (Chief Complaint)  Malignant melanoma of superior right back.    History of Present Illness  Mango Hernandez is a 53 year old male who on 05/14/2024 underwent shave biopsy of a lesion in the right superior upper back which showed nodular melanoma with following characteristics: ulceration, 3.8 mm depth, 5 mitoses/mm2, no microsatellites, no LVI, no neurotropism, no tumor regression.     PET/CT scan showed no evidence of metastatic disease.     On 06/06/2024 he underwent wide local excision and sentinel lymph node biopsy. Pathology showed no residual melanoma at the primary site and 1 sentinel lymph node with metastasis. Patient was staged as [id number].     Performance Status   Karnofsky 100% - Normal, no complaints.     Past Medical History   Malignant melanoma (as above); dilated cardiomyopathy; hypertension.     Past Surgical History   Wide local excision and sentinel lymph node biopsy for melanoma (as above); pacemaker placed and removed.     Family History   Paternal grandmother with lung cancer; paternal grandfather with ?bone cancer; paternal aunt with cancer of unknown type; maternal grandmother with \"abdominal\" cancer.     Social History    Previous tobacco use.     Current Medications  No outpatient medications have been marked as taking for the 6/12/24 encounter (Office Visit) with Lacho Hodge MD.     Allergies   Mr. Hernandez is allergic to simvastatin.       Review of Systems   Constitutional      No fevers, chills, night sweats, excessive fatigue.  Allergic/Immunologic No reactions.  Eyes                   No significant visual changes.  ENMT                 No oral pain, sore throat, epistaxis, hearing changes.  Endocrine            No hot flashes; no diabetes, thyroid disease.  Lymphatic  No tender or enlarged lymph nodes.  Respiratory          No dyspnea, cough, hemoptysis, pleuritic chest pain.  Cardiovascular     No anginal chest pain, palpitations, edema, orthopnea.  Gastrointestinal   No nausea, vomiting, diarrhea, constipation, melena, hematochezia, heartburn, early satiety, change in stool caliber.  Genitourinary      No hematuria, dysuria, increased frequency, urgency, hesitancy, incontinence.  Musculoskeletal   No joint pain or swelling.  Integumentary      No acute or chronic rashes.  Neurologic           No headache, blurred vision, areas of focal weakness or numbness, peripheral neuropathy, changes in gait.   Psychiatric          No new or worsening depression, luciana, mood swings, insomnia.    Vital Signs   /72 (BP Location: Left arm, Patient Position: Sitting, Cuff Size: adult)   Pulse 55   Temp 97.4 °F (36.3 °C) (Tympanic)   Resp 18   Ht 1.806 m (5' 11.1\")   Wt 84.2 kg (185 lb 9.6 oz)   SpO2 99%   BMI 25.81 kg/m²     Physical Examination   Constitutional      Well developed, well nourished. Appears close to chronological age. No apparent distress.   Head                   Normocephalic and atraumatic.  Eyes   Conjunctiva clear; sclera anicteric.  ENMT                 External nose normal; external ears normal.  Neck                   Supple, without masses.  Hematologic/Lymphatic No cervical,  supraclavicular, axillary lymphadenopathy.  Respiratory          Normal effort; no respiratory distress; lungs clear to auscultation bilaterally.  Cardiovascular     Regular rate and rhythm; normal S1S2.  Abdomen            Non-tender; non-distended; no masses; no fluid wave; no hepatosplenomegaly.  Musculoskeletal   No joint swelling or erythema.  Extremities          No lower extremity edema.  Integumentary      No obvious rashes.   Neurologic           Motor and sensory grossly intact.  Psychiatric          Mood and affect appropriate.    Laboratory   Recent Results (from the past 48 hour(s))   COMP METABOLIC PANEL [E]    Collection Time: 06/12/24 12:52 PM   Result Value Ref Range    Glucose 92 70 - 99 mg/dL    Sodium 136 136 - 145 mmol/L    Potassium 4.2 3.5 - 5.1 mmol/L    Chloride 102 98 - 112 mmol/L    CO2 31.0 21.0 - 32.0 mmol/L    Anion Gap 3 0 - 18 mmol/L    BUN 20 9 - 23 mg/dL    Creatinine 0.67 (L) 0.70 - 1.30 mg/dL    Calcium, Total 9.4 8.5 - 10.1 mg/dL    Calculated Osmolality 284 275 - 295 mOsm/kg    eGFR-Cr 113 >=60 mL/min/1.73m2    AST 18 15 - 37 U/L    ALT 22 16 - 61 U/L    Alkaline Phosphatase 46 45 - 117 U/L    Bilirubin, Total 0.4 0.1 - 2.0 mg/dL    Total Protein 7.1 6.4 - 8.2 g/dL    Albumin 3.9 3.4 - 5.0 g/dL    Globulin  3.2 2.8 - 4.4 g/dL    A/G Ratio 1.2 1.0 - 2.0    Patient Fasting for CMP? No    TSH + FREE T4 [E]    Collection Time: 06/12/24 12:52 PM   Result Value Ref Range    Free T4 0.9 0.8 - 1.7 ng/dL    TSH 1.060 0.358 - 3.740 mIU/mL   CBC W/ DIFFERENTIAL    Collection Time: 06/12/24 12:52 PM   Result Value Ref Range    WBC 9.3 4.0 - 11.0 x10(3) uL    RBC 4.04 (L) 4.30 - 5.70 x10(6)uL    HGB 12.8 (L) 13.0 - 17.5 g/dL    HCT 37.6 (L) 39.0 - 53.0 %    .0 150.0 - 450.0 10(3)uL    MCV 93.1 80.0 - 100.0 fL    MCH 31.7 26.0 - 34.0 pg    MCHC 34.0 31.0 - 37.0 g/dL    RDW 12.3 %    Neutrophil Absolute Prelim 6.63 1.50 - 7.70 x10 (3) uL    Neutrophil Absolute 6.63 1.50 - 7.70 x10(3) uL     Lymphocyte Absolute 1.82 1.00 - 4.00 x10(3) uL    Monocyte Absolute 0.59 0.10 - 1.00 x10(3) uL    Eosinophil Absolute 0.20 0.00 - 0.70 x10(3) uL    Basophil Absolute 0.03 0.00 - 0.20 x10(3) uL    Immature Granulocyte Absolute 0.04 0.00 - 1.00 x10(3) uL    Neutrophil % 71.4 %    Lymphocyte % 19.5 %    Monocyte % 6.3 %    Eosinophil % 2.1 %    Basophil % 0.3 %    Immature Granulocyte % 0.4 %     Impression and Plan   1.   Malignant melanoma of right upper back: Patient is staged as [id number]. I reviewed with the patient his diagnosis and risk of distant recurrence. I recommend adjuvant systemic therapy with nivolumab.           I reviewed with the patient the curative goals of therapy and the potential adverse effects including, but not limited to, immune mediated endocrinopathies, dermatitis, hepatitis, pneumonitis, myocarditis, neuropathies, nephritis. Patient expressed understanding and provided verbal consent for treatment. We will plan to begin therapy next week.    2.   Emotional well being: Patient's emotional well being was assessed.  No issues requiring acute psychosocial intervention were identified.    Planned Follow Up   As above.     Risk Level: High - melanoma.    Electronically signed by:    Lacho Hodge M.D.  Systemic Medical Director of Oncology Services  [facility]

## 2024-06-13 PROBLEM — C77.3 SECONDARY MALIGNANT NEOPLASM OF AXILLARY LYMPH NODES (HCC): Status: ACTIVE | Noted: 2024-06-13

## 2024-06-17 ENCOUNTER — OFFICE VISIT (OUTPATIENT)
Dept: SURGERY | Facility: CLINIC | Age: 54
End: 2024-06-17
Payer: COMMERCIAL

## 2024-06-17 VITALS
TEMPERATURE: 98 F | BODY MASS INDEX: 27 KG/M2 | WEIGHT: 192.63 LBS | HEART RATE: 54 BPM | RESPIRATION RATE: 20 BRPM | SYSTOLIC BLOOD PRESSURE: 115 MMHG | DIASTOLIC BLOOD PRESSURE: 77 MMHG

## 2024-06-17 DIAGNOSIS — C77.9 MALIGNANT MELANOMA METASTATIC TO LYMPH NODE (HCC): ICD-10-CM

## 2024-06-17 DIAGNOSIS — C43.59 MALIGNANT MELANOMA OF UPPER BACK (HCC): Primary | ICD-10-CM

## 2024-06-17 PROCEDURE — 99024 POSTOP FOLLOW-UP VISIT: CPT

## 2024-06-17 NOTE — PROGRESS NOTES
[facility] Surgical Oncology      Patient Name:  Mango Hernandez   YOB: 1970   Gender:  Male   Appt Date:  6/17/2024   Provider:  FÉLIX Montes     PATIENT PROVIDERS  Referring Provider: FÉLIX Ventura     Primary Care Provider: Liyah Han MD       CHIEF COMPLAINT  Chief Complaint   Patient presents with    Post-Op        PROBLEMS  Reviewed   Patient Active Problem List   Diagnosis    Acute bilateral low back pain with bilateral sciatica    Lumbar muscle pain    Dilated cardiomyopathy (HCC)    Essential hypertension, benign    Left knee pain    Vitamin D deficiency    Malignant melanoma of upper back (HCC)    Hematoma    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 He states he has had a mole on his upper back since childhood. His wife noticed his mole was growing for the past 2-3 months. Patient denies itching, bleeding or tenderness. Denies weight loss. Patient does mention some \"bumps\" on the right armpit, states the size of these \"bumps\" fluctuates through out the day. Patient does enjoy outdoor activities. Denies history of sunburns. States he applies sunscreen with activities.      Shave biopsy --> Right Superior Lateral Upper Back: Malignant Melanoma (3.8mm)      Interval History:  6/6/2024: s/p wide excision with sentinel lymph node biopsy --> 3.8 mm ulcerated, 1/1 lymph node positive for melanoma    6/7/2024: Post op hematoma, I&D with drain placement    6/12/2024: Consultation with Dr Hodge recommending adjuvant immunotherapy.     Patient is following up for hematoma of the right upper back s/p wide excision of melanoma. He states the hematoma remains but has significantly improved in size. He denies pain or discomfort. No drainage.      Vital Signs:  /77 (BP Location: Right  arm, Patient Position: Sitting, Cuff Size: adult)   Pulse 54   Temp 98.2 °F (36.8 °C) (Temporal)   Resp 20   Wt 87.4 kg (192 lb 9.6 oz)   BMI 26.79 kg/m²      Medications Reviewed:    Current Outpatient Medications:     carvedilol 25 MG Oral Tab, Take 1 tablet (25 mg total) by mouth 2 (two) times daily with meals., Disp: , Rfl:     lisinopril 10 MG Oral Tab, Take 1 tablet (10 mg total) by mouth daily., Disp: , Rfl:     spironolactone 25 MG Oral Tab, Take 1 tablet (25 mg total) by mouth daily., Disp: , Rfl:      Allergies Reviewed:  Allergies   Allergen Reactions    Simvastatin OTHER (SEE COMMENTS)     Statin myopathy        History:  Reviewed:  Past Medical History:    Cardiomyopathy (HCC)    Congestive heart disease (HCC)    Dilated cardiomyopathy (HCC)    hx of pacemaker    High blood pressure    Hypertension    Knee pain    Lumbar pain    Visual impairment    Vitamin D deficiency      Reviewed:  Past Surgical History:   Procedure Laterality Date    Colonoscopy  2022    Other surgical history Right     Wide excision with sentinel node (R upper back)    Pacemaker clinic, cardio (internal)      2005 and 2014. Removed 3/2023    Skin biopsy        Reviewed Social History:  Social History     Socioeconomic History    Marital status:    Tobacco Use    Smoking status: Former     Types: Cigarettes    Smokeless tobacco: Never    Tobacco comments:     Quit 2005   Vaping Use    Vaping status: Former   Substance and Sexual Activity    Alcohol use: Not Currently     Comment: occasionally    Drug use: Yes     Types: Cannabis     Comment: 5-7 to sleep      Reviewed:  Family History   Problem Relation Age of Onset    Cancer Maternal Grandmother     Heart Disorder Maternal Grandfather     Cancer Paternal Grandmother     Cancer Paternal Grandfather     Cancer Other       Review of Systems:  Doing well post-operatively  Denies fever or chills  Denies chest pain or SOB  Denies abdominal pain or N/V  Denies warmth,  erythema, or drainage at incision       Physical Examination:  Constitutional:  NAD.   Eyes: non-icteric.    Abdomen: Non-distended  Musculoskeletal: no lower extremity edema  Skin: Right axillary incision site healing well. Right upper back incision site healing well. Hematoma stable. Incision healing well without drainage or erythema. Sutures in place.      Document Review:  None     Procedure(s):  After informed consent, the site (right upper back) was prepped. A 22-gauge needle was used. 10 mL of dark blood was aspirated. Unable to aspirate additional fluid. Dressing was applied. Instructions were discussed. Patient verbalized understanding.        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 Discussed leaving sutures in at this time to encourage healing and prevent incision from opening with underlying hematoma. Discussed compression. Patient expressed that he also had persistent seroma after pacemaker was removed.   - Reviewed signs and symptoms to return to clinic or call the office  - Patient is starting immunotherapy this Thursday. He will come to our clinic for brief incision check at that time.       Follow Up:  Incision check Thursday       Electronically Signed by: FÉLIX Montes

## 2024-06-20 ENCOUNTER — SOCIAL WORK SERVICES (OUTPATIENT)
Dept: HEMATOLOGY/ONCOLOGY | Facility: HOSPITAL | Age: 54
End: 2024-06-20

## 2024-06-20 ENCOUNTER — OFFICE VISIT (OUTPATIENT)
Dept: SURGERY | Facility: CLINIC | Age: 54
End: 2024-06-20

## 2024-06-20 ENCOUNTER — OFFICE VISIT (OUTPATIENT)
Dept: HEMATOLOGY/ONCOLOGY | Facility: HOSPITAL | Age: 54
End: 2024-06-20
Attending: SPECIALIST

## 2024-06-20 VITALS
HEIGHT: 71.1 IN | TEMPERATURE: 98 F | BODY MASS INDEX: 26.61 KG/M2 | DIASTOLIC BLOOD PRESSURE: 67 MMHG | WEIGHT: 192.19 LBS | RESPIRATION RATE: 16 BRPM | OXYGEN SATURATION: 98 % | SYSTOLIC BLOOD PRESSURE: 109 MMHG | HEART RATE: 68 BPM

## 2024-06-20 DIAGNOSIS — C43.59 MALIGNANT MELANOMA OF UPPER BACK (HCC): Primary | ICD-10-CM

## 2024-06-20 DIAGNOSIS — C77.9 MALIGNANT MELANOMA METASTATIC TO LYMPH NODE (HCC): ICD-10-CM

## 2024-06-20 DIAGNOSIS — Z71.9 ENCOUNTER FOR HEALTH EDUCATION: ICD-10-CM

## 2024-06-20 DIAGNOSIS — Z51.12 ENCOUNTER FOR ANTINEOPLASTIC IMMUNOTHERAPY: ICD-10-CM

## 2024-06-20 PROCEDURE — 99214 OFFICE O/P EST MOD 30 MIN: CPT | Performed by: NURSE PRACTITIONER

## 2024-06-20 PROCEDURE — 96413 CHEMO IV INFUSION 1 HR: CPT

## 2024-06-20 PROCEDURE — 99024 POSTOP FOLLOW-UP VISIT: CPT

## 2024-06-20 NOTE — PROGRESS NOTES
met with patient and Wife Crissy in treatment room for introduction and role explanation. Patient scored 0 on Distress Screening. Patient is here monthly for treatment, stating no concerns at this time. He appears to be coping well and understands his treatment plan, expressing no issues.     Patient lives in [place], [place] with Wife Crissy. They have 5 adult children between the two of them, with 3 in the area. They also have 3 Grandchildren, with another due in about 1week. Family appears to communicate well and be a good source of support to patient.       educated on FMLA/STD benefits, encouraging patient to confirm benefits with employer. Provided Forms Department flyer as well for any paperwork that may be required. 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 He does not anticipate needing any FMLA. Wife works at [employer] and echoed that she does not need FMLA for taking off 1x monthly. They are aware of process if needed.      assisted in completing Power of  for Healthcare where patient named Wife Crissy as Primary Agent; copies made and handed to family and given to PSLARISA Henning to upload into their chart.     Educated on available resources, providing Social Work Support Packet including [facility] Support Services, [facility], Transportation, and Home Care contacts. Educated on BHI if desired. Contact provided and family is aware to reach out with any needs. Bringing Graciela Bag given, which patient was grateful for.

## 2024-06-20 NOTE — PROGRESS NOTES
Immunotherapy  Education    Learner:  Patient and Spouse    Barriers / Limitations:  None    Immunotherapy  education goals:  Learn the drug names  Administration schedule  Routes of administration  Treatment setting    Drug names:  nivolumab    Immunotherapy action on cancer / normal cells:  Achieved    Lung: pneumonitis, cough, shortness of breath  Achieved    Gastrointestinal (Colitis): Diarrhea, Mucus, blood  Achieved    Liver (Hepatitis): Yellowing of skin or eyes, Nausea or vomiting, abdominal pain on right, dark urine, excessive bruising or bleeding  Achieved    Hormone Gland (Thyroid, Pituitary, Adrenal and pancreas): rapid heart rate, weight loss or gain, increased sweating, feeling more hungry or thirsty, urinating more frequently, hair loss, feeling cold, constipation, deeper voice, muscle aches, dizziness or fainting, headache  Achieved    Kidney Problems (Nephritis): change in amount or color of urine  Achieved    Problems in other organs: Rash, change in eyesight, severe or persistent muscle or joint pains, severe muscle weakness, low red blood cell (Anemia)  Achieved    Infusion Reactions: Chills, shaking, shortness of breath or wheezing, itching or rash, flushing, dizziness, fever, feeling like passing out  Achieved      Teaching Materials Provided:     Chemotherapy information sheets from [employer] provided  ACS Nutrition for the Person with Cancer during Treatment / Dietician information sheet  When to contact the Treatment Team Information Sheet  [facility] Support Services Sheet  National Resources Information sheet    Patient and care partner were given ample opportunity to ask questions. All questions and concerns addressed. We discussed self care techniques, symptom management, fluid, diet, and activities. Patient verbalized understanding of above information provided.     Immunotherapy Consent Form signed by the patient.    I spent a total of 25 minutes with the patient, 100 % of that time  was spent counseling patient regarding the above documented side effects and management, when to call provider and contact information.     Encounter Times  PreCharting: 3 minutes    Reviewing/Obtaining:   minutes      Medical Exam:   minutes    Plan:   minutes      Notes: 2 minutes    Counseling/Education: 25 minutes      Referring/Communicating:   minutes    Ind Interpretation:   minutes      Care Coordination:   minutes       My total time spent caring for the patient on the day of the encounter: 30 minutes.     Electronically signed:     Lovely Alejandre DNP, NP-C  Nurse Practitioner  [facility]

## 2024-06-20 NOTE — PROGRESS NOTES
Pt here for C1D1 Drug(s)opdivo.  Arrives Ambulating independently, accompanied by Self and Spouse     Patient was evaluated today by KATINA.    Oral medications included in this regimen:  no    Patient confirms comprehension of cancer treatment schedule:  yes    Pregnancy screening:  Not applicable    Modifications in dose or schedule:  No    Medications appearance and physical integrity checked by RN: yes.    Chemotherapy IV pump settings verified by 2 RNs:  No due to targeted therapy IV administration.  Frequency of blood return and site check throughout administration: Prior to administration     Infusion/treatment outcome:  patient tolerated treatment without incident    Education Record    Learner:  Patient  Barriers / Limitations:  None  Method:  Discussion  Education / instructions given:  avs  Outcome:  Shows understanding    Discharged Home, Ambulating independently, accompanied by:Self and Spouse    Patient/family verbalized understanding of future appointments: by [employer] messaging    Here for education for opdivo, and first opdivo infusion. Baseline labs already completed. Tolerated well. Met with sw while here as well. Next cycle scheduled.

## 2024-06-21 ENCOUNTER — TELEPHONE (OUTPATIENT)
Dept: HEMATOLOGY/ONCOLOGY | Facility: HOSPITAL | Age: 54
End: 2024-06-21

## 2024-06-21 NOTE — PROGRESS NOTES
[facility] Surgical Oncology      Patient Name:  Mango Hernandez   YOB: 1970   Gender:  Male   Appt Date:  6/20/2024   Provider:  FÉLIX Montes     PATIENT PROVIDERS  Referring Provider: FÉLIX Ventura     Primary Care Provider: Liyah Han MD       CHIEF COMPLAINT  Chief Complaint   Patient presents with    Post-Op        PROBLEMS  Reviewed   Patient Active Problem List   Diagnosis    Acute bilateral low back pain with bilateral sciatica    Lumbar muscle pain    Dilated cardiomyopathy (HCC)    Essential hypertension, benign    Left knee pain    Vitamin D deficiency    Malignant melanoma of upper back (HCC)    Hematoma    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 He states he has had a mole on his upper back since childhood. His wife noticed his mole was growing for the past 2-3 months. Patient denies itching, bleeding or tenderness. Denies weight loss. Patient does mention some \"bumps\" on the right armpit, states the size of these \"bumps\" fluctuates through out the day. Patient does enjoy outdoor activities. Denies history of sunburns. States he applies sunscreen with activities.      Shave biopsy --> Right Superior Lateral Upper Back: Malignant Melanoma (3.8mm)      Interval History:  6/6/2024: s/p wide excision with sentinel lymph node biopsy --> 3.8 mm ulcerated, 1/1 lymph node positive for melanoma     6/7/2024: Post op hematoma, I&D with drain placement     6/12/2024: Consultation with Dr Hodge recommending adjuvant immunotherapy.    He is here for skin check of left upper back. Hematoma has improved. Incision site healing well. No issues with drainage. He is getting his first immunotherapy today.        Medications Reviewed:    Current Outpatient Medications:     carvedilol 25 MG Oral  Tab, Take 1 tablet (25 mg total) by mouth 2 (two) times daily with meals., Disp: , Rfl:     lisinopril 10 MG Oral Tab, Take 1 tablet (10 mg total) by mouth daily., Disp: , Rfl:     spironolactone 25 MG Oral Tab, Take 1 tablet (25 mg total) by mouth daily., Disp: , Rfl:      Allergies Reviewed:  Allergies   Allergen Reactions    Simvastatin OTHER (SEE COMMENTS)     Statin myopathy        History:  Reviewed:  Past Medical History:    Cardiomyopathy (HCC)    Congestive heart disease (HCC)    Dilated cardiomyopathy (HCC)    hx of pacemaker    High blood pressure    Hypertension    Knee pain    Lumbar pain    Visual impairment    Vitamin D deficiency      Reviewed:  Past Surgical History:   Procedure Laterality Date    Colonoscopy  2022    Other surgical history Right     Wide excision with sentinel node (R upper back)    Pacemaker clinic, cardio (internal)      2005 and 2014. Removed 3/2023    Skin biopsy        Reviewed Social History:  Social History     Socioeconomic History    Marital status:    Tobacco Use    Smoking status: Former     Types: Cigarettes    Smokeless tobacco: Never    Tobacco comments:     Quit 2005   Vaping Use    Vaping status: Former   Substance and Sexual Activity    Alcohol use: Not Currently     Comment: occasionally    Drug use: Yes     Types: Cannabis     Comment: 5-7 to sleep      Reviewed:  Family History   Problem Relation Age of Onset    Cancer Maternal Grandmother     Heart Disorder Maternal Grandfather     Cancer Paternal Grandmother     Cancer Paternal Grandfather     Cancer Other       Review of Systems:  Doing well post-operatively  Denies fever or chills  Denies chest pain or SOB  Denies abdominal pain or N/V  Denies dysuria or hematuria  Denies warmth, erythema, or drainage at incision       Physical Examination:  Constitutional:  NAD.   Eyes: non-icteric.    Abdomen: Non-distended  Musculoskeletal: no edema.   Skin: Left upper back incision healing well. Suture in place.  No surrounding erythema or drainage. Hematoma overall improved in size. Right axillary incision site healing well.      Document Review:  None     Procedure(s):  Suture removal. Steri strips placed over.      Assessment / Plan:  Malignant melanoma of upper back   Malignant melanoma metastatic to lymph node   Hematoma   - No acute surgical issues  - Residual sutures removed today. Incision covered with steri strips.   - Hematoma improving, suspect overall gradual resolution with time  - Patient is starting immunotherapy today. Advised to follow up with any questions or concerns  - Right axillary US ordered for 4 months      Follow Up:  4 months for skin check and US       Electronically Signed by: FÉLIX Montes

## 2024-06-21 NOTE — TELEPHONE ENCOUNTER
Toxicities: C1 D1 Pembrolizumab on 6/20/2024    Mango reports he feels \"great.\" No side effects at this time. I encouraged him to please call the office if he is not feeling well or has any questions or concerns. He agreed and thanked me for checking on him.

## 2024-07-18 ENCOUNTER — OFFICE VISIT (OUTPATIENT)
Dept: HEMATOLOGY/ONCOLOGY | Facility: HOSPITAL | Age: 54
End: 2024-07-18
Attending: SPECIALIST
Payer: COMMERCIAL

## 2024-07-18 VITALS
BODY MASS INDEX: 26.17 KG/M2 | WEIGHT: 189 LBS | RESPIRATION RATE: 20 BRPM | HEIGHT: 71.1 IN | OXYGEN SATURATION: 98 % | SYSTOLIC BLOOD PRESSURE: 127 MMHG | DIASTOLIC BLOOD PRESSURE: 78 MMHG | HEART RATE: 50 BPM | TEMPERATURE: 98 F

## 2024-07-18 DIAGNOSIS — C43.59 MALIGNANT MELANOMA OF UPPER BACK (HCC): Primary | ICD-10-CM

## 2024-07-18 DIAGNOSIS — Z51.12 ENCOUNTER FOR ANTINEOPLASTIC IMMUNOTHERAPY: ICD-10-CM

## 2024-07-18 LAB
ALBUMIN SERPL-MCNC: 4.8 G/DL (ref 3.2–4.8)
ALBUMIN/GLOB SERPL: 1.9 {RATIO} (ref 1–2)
ALP LIVER SERPL-CCNC: 48 U/L
ALT SERPL-CCNC: 20 U/L
ANION GAP SERPL CALC-SCNC: 3 MMOL/L (ref 0–18)
AST SERPL-CCNC: 14 U/L (ref ?–34)
BASOPHILS # BLD AUTO: 0.04 X10(3) UL (ref 0–0.2)
BASOPHILS NFR BLD AUTO: 0.5 %
BILIRUB SERPL-MCNC: 0.7 MG/DL (ref 0.3–1.2)
BUN BLD-MCNC: 18 MG/DL (ref 9–23)
CALCIUM BLD-MCNC: 9.9 MG/DL (ref 8.7–10.4)
CHLORIDE SERPL-SCNC: 104 MMOL/L (ref 98–112)
CO2 SERPL-SCNC: 29 MMOL/L (ref 21–32)
CREAT BLD-MCNC: 0.81 MG/DL
EGFRCR SERPLBLD CKD-EPI 2021: 107 ML/MIN/1.73M2 (ref 60–?)
EOSINOPHIL # BLD AUTO: 0.14 X10(3) UL (ref 0–0.7)
EOSINOPHIL NFR BLD AUTO: 1.9 %
ERYTHROCYTE [DISTWIDTH] IN BLOOD BY AUTOMATED COUNT: 12 %
GLOBULIN PLAS-MCNC: 2.5 G/DL (ref 2.8–4.4)
GLUCOSE BLD-MCNC: 76 MG/DL (ref 70–99)
HCT VFR BLD AUTO: 41.2 %
HGB BLD-MCNC: 13.6 G/DL
IMM GRANULOCYTES # BLD AUTO: 0.01 X10(3) UL (ref 0–1)
IMM GRANULOCYTES NFR BLD: 0.1 %
LYMPHOCYTES # BLD AUTO: 1.95 X10(3) UL (ref 1–4)
LYMPHOCYTES NFR BLD AUTO: 26 %
MCH RBC QN AUTO: 31 PG (ref 26–34)
MCHC RBC AUTO-ENTMCNC: 33 G/DL (ref 31–37)
MCV RBC AUTO: 93.8 FL
MONOCYTES # BLD AUTO: 0.58 X10(3) UL (ref 0.1–1)
MONOCYTES NFR BLD AUTO: 7.7 %
NEUTROPHILS # BLD AUTO: 4.79 X10 (3) UL (ref 1.5–7.7)
NEUTROPHILS # BLD AUTO: 4.79 X10(3) UL (ref 1.5–7.7)
NEUTROPHILS NFR BLD AUTO: 63.8 %
OSMOLALITY SERPL CALC.SUM OF ELEC: 283 MOSM/KG (ref 275–295)
PLATELET # BLD AUTO: 264 10(3)UL (ref 150–450)
POTASSIUM SERPL-SCNC: 4.1 MMOL/L (ref 3.5–5.1)
PROT SERPL-MCNC: 7.3 G/DL (ref 5.7–8.2)
RBC # BLD AUTO: 4.39 X10(6)UL
SODIUM SERPL-SCNC: 136 MMOL/L (ref 136–145)
T4 FREE SERPL-MCNC: 1.2 NG/DL (ref 0.8–1.7)
TSI SER-ACNC: 0.58 MIU/ML (ref 0.55–4.78)
WBC # BLD AUTO: 7.5 X10(3) UL (ref 4–11)

## 2024-07-18 PROCEDURE — 99215 OFFICE O/P EST HI 40 MIN: CPT | Performed by: SPECIALIST

## 2024-07-18 PROCEDURE — 96413 CHEMO IV INFUSION 1 HR: CPT

## 2024-07-18 NOTE — PROGRESS NOTES
Pt here for C2D1 Drug(s)Odivo.  Arrives Ambulating independently, accompanied by Self     Patient was evaluated today by MD and Treatment Nurse.    Oral medications included in this regimen:  no    Patient confirms comprehension of cancer treatment schedule:  yes    Pregnancy screening:  Denies possibility of pregnancy    Modifications in dose or schedule:  Yes    Medications appearance and physical integrity checked by RN: yes.    Chemotherapy IV pump settings verified by 2 RNs:  Yes.  Frequency of blood return and site check throughout administration: Prior to administration     Infusion/treatment outcome:  patient tolerated treatment without incident    Education Record    Learner:  Patient  Barriers / Limitations:  None  Method:  Brief focused  Education / instructions given:  patient  Outcome:  Shows understanding    Discharged Home, Ambulating independently, accompanied by:Self    Patient/family verbalized understanding of future appointments: by printed AVS        Patient tolerated well his infusion . Given AVS

## 2024-07-18 NOTE — PROGRESS NOTES
[facility] Progress Note      Patient Name: Mango Hernandez   YOB: 1970  Medical Record Number: [id number]  Attending Physician: Lacho Hodge M.D.      The 21st Century Cures Act makes medical notes like these available to patients in the interest of transparency. Please be advised this is a medical document. Medical documents are intended to carry relevant information, facts as evident, and the clinical opinion of the practitioner. The medical note is intended as peer to peer communication and may appear blunt or direct. It is written in medical language and may contain abbreviations or verbiage that are unfamiliar.      Date of Visit: 7/18/2024        Chief Complaint  Malignant melanoma of superior right back - follow up and treatment.    Oncologic History  Mango Hernandez is a 53 year old male who on 05/14/2024 underwent shave biopsy of a lesion in the right superior upper back which showed nodular melanoma with following characteristics: ulceration, 3.8 mm depth, 5 mitoses/mm2, no microsatellites, no LVI, no neurotropism, no tumor regression.     PET/CT scan showed no evidence of metastatic disease.     On 06/06/2024 he underwent wide local excision and sentinel lymph node biopsy. Pathology showed no residual melanoma at the primary site and 1 sentinel lymph node with metastasis. Patient was staged as [id number].    On 06/20/2024 he began adjuvant therapy with nivolumab.     History of Present Illness  Patient returns for scheduled follow up and treatment. He denies any new respiratory or GI symptoms. No new pruritus or rashes. Stable appetite and energy level.    Performance Status   Karnofsky 100% - Normal, no complaints.     Past Medical History (historical data, reviewed by physician)   Malignant melanoma (as above); dilated cardiomyopathy; hypertension.     Past Surgical History (historical data, reviewed by physician)   Wide local excision and sentinel lymph node  biopsy for melanoma (as above); pacemaker placed and removed.     Family History (historical data, reviewed by physician)   Paternal grandmother with lung cancer; paternal grandfather with ?bone cancer; paternal aunt with cancer of unknown type; maternal grandmother with \"abdominal\" cancer.     Social History (historical data, reviewed by physician)   Previous tobacco use.     Current Medications   carvedilol 25 MG Oral Tab Take 1 tablet (25 mg total) by mouth 2 (two) times daily with meals.      lisinopril 10 MG Oral Tab Take 1 tablet (10 mg total) by mouth daily.      spironolactone 25 MG Oral Tab Take 1 tablet (25 mg total) by mouth daily.       Allergies   Mr. Hernandez is allergic to simvastatin.     Vital Signs   /78 (BP Location: Left arm, Patient Position: Sitting, Cuff Size: adult)   Pulse 50   Temp 98.1 °F (36.7 °C) (Tympanic)   Resp 20   Ht 1.806 m (5' 11.1\")   Wt 85.7 kg (189 lb)   SpO2 98%   BMI 26.28 kg/m²     Physical Examination   Constitutional      Well developed, well nourished. Appears close to chronological age. No apparent distress.   Head                   Normocephalic and atraumatic.  Eyes   Conjunctiva clear; sclera anicteric.  ENMT                 External nose normal; external ears normal.  Neck                   Supple, without masses.  Hematologic/Lymphatic No cervical, supraclavicular, axillary lymphadenopathy.  Respiratory          Normal effort; no respiratory distress; lungs clear to auscultation bilaterally.  Cardiovascular     Regular rate and rhythm; normal S1S2.  Abdomen            Non-tender; non-distended; no masses; no fluid wave; no hepatosplenomegaly.  Extremities          No lower extremity edema.  Neurologic           Motor and sensory grossly intact.  Psychiatric          Mood and affect appropriate.    Laboratory   Recent Results (from the past 168 hour(s))   Comp Metabolic Panel (14)    Collection Time: 07/18/24 12:44 PM   Result Value Ref Range    Glucose  76 70 - 99 mg/dL    Sodium 136 136 - 145 mmol/L    Potassium 4.1 3.5 - 5.1 mmol/L    Chloride 104 98 - 112 mmol/L    CO2 29.0 21.0 - 32.0 mmol/L    Anion Gap 3 0 - 18 mmol/L    BUN 18 9 - 23 mg/dL    Creatinine 0.81 0.70 - 1.30 mg/dL    Calcium, Total 9.9 8.7 - 10.4 mg/dL    Calculated Osmolality 283 275 - 295 mOsm/kg    eGFR-Cr 107 >=60 mL/min/1.73m2    AST 14 <34 U/L    ALT 20 10 - 49 U/L    Alkaline Phosphatase 48 45 - 117 U/L    Bilirubin, Total 0.7 0.3 - 1.2 mg/dL    Total Protein 7.3 5.7 - 8.2 g/dL    Albumin 4.8 3.2 - 4.8 g/dL    Globulin  2.5 (L) 2.8 - 4.4 g/dL    A/G Ratio 1.9 1.0 - 2.0    Patient Fasting for CMP? Patient not present    TSH [E]    Collection Time: 07/18/24 12:44 PM   Result Value Ref Range    TSH 0.584 0.550 - 4.780 mIU/mL   T4 FREE [E]    Collection Time: 07/18/24 12:44 PM   Result Value Ref Range    Free T4 1.2 0.8 - 1.7 ng/dL   CBC W/ DIFFERENTIAL    Collection Time: 07/18/24 12:44 PM   Result Value Ref Range    WBC 7.5 4.0 - 11.0 x10(3) uL    RBC 4.39 4.30 - 5.70 x10(6)uL    HGB 13.6 13.0 - 17.5 g/dL    HCT 41.2 39.0 - 53.0 %    .0 150.0 - 450.0 10(3)uL    MCV 93.8 80.0 - 100.0 fL    MCH 31.0 26.0 - 34.0 pg    MCHC 33.0 31.0 - 37.0 g/dL    RDW 12.0 %    Neutrophil Absolute Prelim 4.79 1.50 - 7.70 x10 (3) uL    Neutrophil Absolute 4.79 1.50 - 7.70 x10(3) uL    Lymphocyte Absolute 1.95 1.00 - 4.00 x10(3) uL    Monocyte Absolute 0.58 0.10 - 1.00 x10(3) uL    Eosinophil Absolute 0.14 0.00 - 0.70 x10(3) uL    Basophil Absolute 0.04 0.00 - 0.20 x10(3) uL    Immature Granulocyte Absolute 0.01 0.00 - 1.00 x10(3) uL    Neutrophil % 63.8 %    Lymphocyte % 26.0 %    Monocyte % 7.7 %    Eosinophil % 1.9 %    Basophil % 0.5 %    Immature Granulocyte % 0.1 %     Impression and Plan   1.   Malignant melanoma of right upper back: Patient is staged as [id number]. In 06/2024, he began adjuvant systemic therapy with nivolumab.           Proceed with cycle 2 of nivolumab without modification. Continue  regular follow up with dermatology.     Patient will continue to receive longitudinal care by me for the complex care required for the cancer diagnosis including the expected complications related to anticancer therapy.    Planned Follow Up   Patient will return for follow up and treatment in 4 weeks.    Electronically signed by:    Lacho Hodge M.D.  Systemic Medical Director of Oncology Services  [facility]

## 2024-07-18 NOTE — PROGRESS NOTES
Patient is here for MD f/u and cycle 2 of Opdivo. Patient tolerated first cycle well. No GI or skin issues. Patient did have a few migraines r/t sinus/allergies. Appetite is good. Energy level is fair.     Education Record    Learner:  Patient and Spouse    Disease / Diagnosis:  Melanoma     Barriers / Limitations:  None   Comments:    Method:  Discussion   Comments:    General Topics:  Plan of care reviewed   Comments:    Outcome:  Shows understanding   Comments:

## 2024-08-12 NOTE — PROGRESS NOTES
[facility] Progress Note      Patient Name: Mango Hernandez   YOB: 1970  Medical Record Number: [id number]  Attending Physician: Lacho Hodge M.D.      The 21st Century Cures Act makes medical notes like these available to patients in the interest of transparency. Please be advised this is a medical document. Medical documents are intended to carry relevant information, facts as evident, and the clinical opinion of the practitioner. The medical note is intended as peer to peer communication and may appear blunt or direct. It is written in medical language and may contain abbreviations or verbiage that are unfamiliar.      Date of Visit: 8/15/2024       Chief Complaint  Malignant melanoma of superior right back - follow up and treatment.    Oncologic History  Mango Hernandez is a 53 year old male who on 05/14/2024 underwent shave biopsy of a lesion in the right superior upper back which showed nodular melanoma with following characteristics: ulceration, 3.8 mm depth, 5 mitoses/mm2, no microsatellites, no LVI, no neurotropism, no tumor regression.     PET/CT scan showed no evidence of metastatic disease.     On 06/06/2024 he underwent wide local excision and sentinel lymph node biopsy. Pathology showed no residual melanoma at the primary site and 1 sentinel lymph node with metastasis. Patient was staged as [id number].    On 06/20/2024 he began adjuvant therapy with nivolumab.     History of Present Illness  Patient returns for scheduled follow up and treatment. He denies any new respiratory or GI symptoms. No new pruritus or rashes. Stable appetite and energy level.    Performance Status   Karnofsky 100% - Normal, no complaints.     Past Medical History (historical data, reviewed by physician)   Malignant melanoma (as above); dilated cardiomyopathy; hypertension.     Past Surgical History (historical data, reviewed by physician)   Wide local excision and sentinel lymph node  biopsy for melanoma (as above); pacemaker placed and removed.     Family History (historical data, reviewed by physician)   Paternal grandmother with lung cancer; paternal grandfather with ?bone cancer; paternal aunt with cancer of unknown type; maternal grandmother with \"abdominal\" cancer.     Social History (historical data, reviewed by physician)   Previous tobacco use.     Current Medications   carvedilol 25 MG Oral Tab Take 1 tablet (25 mg total) by mouth 2 (two) times daily with meals.      lisinopril 10 MG Oral Tab Take 1 tablet (10 mg total) by mouth daily.      spironolactone 25 MG Oral Tab Take 1 tablet (25 mg total) by mouth daily.       Allergies   Mr. Hernandez is allergic to simvastatin.     Vital Signs   /77 (BP Location: Left arm, Patient Position: Sitting, Cuff Size: adult)   Pulse 60   Temp 97.9 °F (36.6 °C) (Temporal)   Resp 16   Ht 1.806 m (5' 11.1\")   Wt 89.4 kg (197 lb)   SpO2 98%   BMI 27.40 kg/m²     Physical Examination   Constitutional      Well developed, well nourished. Appears close to chronological age. No apparent distress.   Head                   Normocephalic and atraumatic.  Eyes   Conjunctiva clear; sclera anicteric.  ENMT                 External nose normal; external ears normal.  Neck                   Supple, without masses.  Hematologic/Lymphatic No cervical, supraclavicular, axillary lymphadenopathy.  Respiratory          Normal effort; no respiratory distress; lungs clear to auscultation bilaterally.  Cardiovascular     Regular rate and rhythm; normal S1S2.  Abdomen            Non-tender; non-distended; no masses; no fluid wave; no hepatosplenomegaly.  Back   At the site of surgical resection, there is a very mobile mass that can be manipulated to move throughout the pocket created by surgery.   Extremities          No lower extremity edema.  Neurologic           Motor and sensory grossly intact.  Psychiatric          Mood and affect appropriate.    Laboratory    Recent Results (from the past 168 hour(s))   CBC W Differential W Platelet    Collection Time: 08/15/24  9:25 AM   Result Value Ref Range    WBC 7.7 4.0 - 11.0 x10(3) uL    RBC 4.23 (L) 4.30 - 5.70 x10(6)uL    HGB 13.4 13.0 - 17.5 g/dL    HCT 40.2 39.0 - 53.0 %    .0 150.0 - 450.0 10(3)uL    MCV 95.0 80.0 - 100.0 fL    MCH 31.7 26.0 - 34.0 pg    MCHC 33.3 31.0 - 37.0 g/dL    RDW 11.9 %    Neutrophil Absolute Prelim 5.43 1.50 - 7.70 x10 (3) uL    Neutrophil Absolute 5.43 1.50 - 7.70 x10(3) uL    Lymphocyte Absolute 1.57 1.00 - 4.00 x10(3) uL    Monocyte Absolute 0.52 0.10 - 1.00 x10(3) uL    Eosinophil Absolute 0.10 0.00 - 0.70 x10(3) uL    Basophil Absolute 0.03 0.00 - 0.20 x10(3) uL    Immature Granulocyte Absolute 0.02 0.00 - 1.00 x10(3) uL    Neutrophil % 70.7 %    Lymphocyte % 20.5 %    Monocyte % 6.8 %    Eosinophil % 1.3 %    Basophil % 0.4 %    Immature Granulocyte % 0.3 %       Impression and Plan   1.   Malignant melanoma of right upper back: Patient is staged as [id number]. In 06/2024, he began adjuvant systemic therapy with nivolumab.           Proceed with cycle 3 of nivolumab without modification. Continue regular follow up with dermatology.           PET/CT scan ordered to be performed at the end of this cycle. US right axilla as ordered by surgical oncology to be performed at the same time.     2.   Mass in area of surgery: Reassured patient that it does not represent malignancy. May represent organized hematoma. Discussed with PA from surgical oncology. Will wait for planned imaging studies before considering intervention.     Patient will continue to receive longitudinal care by me for the complex care required for the cancer diagnosis including the expected complications related to anticancer therapy.    Planned Follow Up   Patient will return for follow up and treatment in 4 weeks.    Electronically signed by:    Lacho D. Undevia, M.D.  Systemic Medical Director of Oncology Services  [facility]  [facility], [facility]

## 2024-08-15 ENCOUNTER — OFFICE VISIT (OUTPATIENT)
Dept: HEMATOLOGY/ONCOLOGY | Facility: HOSPITAL | Age: 54
End: 2024-08-15
Attending: SPECIALIST
Payer: COMMERCIAL

## 2024-08-15 VITALS
WEIGHT: 197 LBS | BODY MASS INDEX: 27.28 KG/M2 | OXYGEN SATURATION: 98 % | DIASTOLIC BLOOD PRESSURE: 77 MMHG | TEMPERATURE: 98 F | SYSTOLIC BLOOD PRESSURE: 130 MMHG | HEIGHT: 71.1 IN | HEART RATE: 60 BPM | RESPIRATION RATE: 16 BRPM

## 2024-08-15 DIAGNOSIS — C43.59 MALIGNANT MELANOMA OF UPPER BACK (HCC): Primary | ICD-10-CM

## 2024-08-15 DIAGNOSIS — C77.3 SECONDARY MALIGNANT NEOPLASM OF AXILLARY LYMPH NODES (HCC): ICD-10-CM

## 2024-08-15 DIAGNOSIS — Z51.12 ENCOUNTER FOR ANTINEOPLASTIC IMMUNOTHERAPY: ICD-10-CM

## 2024-08-15 LAB
ALBUMIN SERPL-MCNC: 4.7 G/DL (ref 3.2–4.8)
ALBUMIN/GLOB SERPL: 2.2 {RATIO} (ref 1–2)
ALP LIVER SERPL-CCNC: 42 U/L
ALT SERPL-CCNC: 23 U/L
ANION GAP SERPL CALC-SCNC: 3 MMOL/L (ref 0–18)
AST SERPL-CCNC: 24 U/L (ref ?–34)
BASOPHILS # BLD AUTO: 0.03 X10(3) UL (ref 0–0.2)
BASOPHILS NFR BLD AUTO: 0.4 %
BILIRUB SERPL-MCNC: 0.4 MG/DL (ref 0.3–1.2)
BUN BLD-MCNC: 20 MG/DL (ref 9–23)
CALCIUM BLD-MCNC: 10 MG/DL (ref 8.7–10.4)
CHLORIDE SERPL-SCNC: 105 MMOL/L (ref 98–112)
CO2 SERPL-SCNC: 30 MMOL/L (ref 21–32)
CREAT BLD-MCNC: 0.78 MG/DL
EGFRCR SERPLBLD CKD-EPI 2021: 108 ML/MIN/1.73M2 (ref 60–?)
EOSINOPHIL # BLD AUTO: 0.1 X10(3) UL (ref 0–0.7)
EOSINOPHIL NFR BLD AUTO: 1.3 %
ERYTHROCYTE [DISTWIDTH] IN BLOOD BY AUTOMATED COUNT: 11.9 %
GLOBULIN PLAS-MCNC: 2.1 G/DL (ref 2–3.5)
GLUCOSE BLD-MCNC: 75 MG/DL (ref 70–99)
HCT VFR BLD AUTO: 40.2 %
HGB BLD-MCNC: 13.4 G/DL
IMM GRANULOCYTES # BLD AUTO: 0.02 X10(3) UL (ref 0–1)
IMM GRANULOCYTES NFR BLD: 0.3 %
LYMPHOCYTES # BLD AUTO: 1.57 X10(3) UL (ref 1–4)
LYMPHOCYTES NFR BLD AUTO: 20.5 %
MCH RBC QN AUTO: 31.7 PG (ref 26–34)
MCHC RBC AUTO-ENTMCNC: 33.3 G/DL (ref 31–37)
MCV RBC AUTO: 95 FL
MONOCYTES # BLD AUTO: 0.52 X10(3) UL (ref 0.1–1)
MONOCYTES NFR BLD AUTO: 6.8 %
NEUTROPHILS # BLD AUTO: 5.43 X10 (3) UL (ref 1.5–7.7)
NEUTROPHILS # BLD AUTO: 5.43 X10(3) UL (ref 1.5–7.7)
NEUTROPHILS NFR BLD AUTO: 70.7 %
OSMOLALITY SERPL CALC.SUM OF ELEC: 287 MOSM/KG (ref 275–295)
PLATELET # BLD AUTO: 248 10(3)UL (ref 150–450)
POTASSIUM SERPL-SCNC: 4.4 MMOL/L (ref 3.5–5.1)
PROT SERPL-MCNC: 6.8 G/DL (ref 5.7–8.2)
RBC # BLD AUTO: 4.23 X10(6)UL
SODIUM SERPL-SCNC: 138 MMOL/L (ref 136–145)
T4 FREE SERPL-MCNC: 1.2 NG/DL (ref 0.8–1.7)
TSI SER-ACNC: 0.66 MIU/ML (ref 0.55–4.78)
WBC # BLD AUTO: 7.7 X10(3) UL (ref 4–11)

## 2024-08-15 PROCEDURE — 84443 ASSAY THYROID STIM HORMONE: CPT | Performed by: SPECIALIST

## 2024-08-15 PROCEDURE — 85025 COMPLETE CBC W/AUTO DIFF WBC: CPT | Performed by: SPECIALIST

## 2024-08-15 PROCEDURE — 80053 COMPREHEN METABOLIC PANEL: CPT | Performed by: SPECIALIST

## 2024-08-15 PROCEDURE — 84439 ASSAY OF FREE THYROXINE: CPT | Performed by: SPECIALIST

## 2024-08-15 PROCEDURE — 36415 COLL VENOUS BLD VENIPUNCTURE: CPT

## 2024-08-15 PROCEDURE — 96413 CHEMO IV INFUSION 1 HR: CPT

## 2024-08-15 NOTE — PROGRESS NOTES
Pt here for C3D1 Drug(s)Opdivo.  Arrives Ambulating independently, accompanied by Self     Patient was evaluated today by MD and Treatment Nurse.    Oral medications included in this regimen:  no    Patient confirms comprehension of cancer treatment schedule:  yes    Pregnancy screening:  Denies possibility of pregnancy    Modifications in dose or schedule:  No    Medications appearance and physical integrity checked by RN: no.    Chemotherapy IV pump settings verified by 2 RNs:  No due to targeted therapy IV administration.  Frequency of blood return and site check throughout administration: Prior to administration     Infusion/treatment outcome:  patient tolerated treatment without incident    Education Record    Learner:  Patient  Barriers / Limitations:  None  Method:  Brief focused  Education / instructions given:  patient  Outcome:  Shows understanding    Discharged Home, Ambulating independently, accompanied by:Self    Patient/family verbalized understanding of future appointments: by printed AVS      Tolerated well his treatment

## 2024-08-15 NOTE — PROGRESS NOTES
Pt here for C3D1 Opdivo. Pt reports increased fatigue with the last cycle. No headaches, skin changes, or GI issues reported. Pt states he still has a lump on his right shoulder after surgery; it is not painful but wants to make sure it will not cause further issues.       Education Record    Learner:  Patient and Spouse    Disease / Diagnosis: Melanoma    Barriers / Limitations:  None   Comments:    Method:  Discussion   Comments:    General Topics:  Medication, Pain, Side effects and symptom management, and Plan of care reviewed   Comments:    Outcome:  Observed demonstration and Shows understanding   Comments:

## 2024-08-27 DIAGNOSIS — C43.59 MALIGNANT MELANOMA OF UPPER BACK (HCC): Primary | ICD-10-CM

## 2024-09-04 ENCOUNTER — HOSPITAL ENCOUNTER (OUTPATIENT)
Dept: ULTRASOUND IMAGING | Facility: HOSPITAL | Age: 54
Discharge: HOME | End: 2024-09-04
Payer: COMMERCIAL

## 2024-09-04 ENCOUNTER — APPOINTMENT (OUTPATIENT)
Dept: NUCLEAR MEDICINE | Facility: HOSPITAL | Age: 54
End: 2024-09-04
Attending: SPECIALIST
Payer: COMMERCIAL

## 2024-09-04 ENCOUNTER — TELEPHONE (OUTPATIENT)
Dept: SURGERY | Facility: CLINIC | Age: 54
End: 2024-09-04

## 2024-09-04 DIAGNOSIS — C43.59 MALIGNANT MELANOMA OF UPPER BACK (HCC): ICD-10-CM

## 2024-09-04 DIAGNOSIS — C77.9 MALIGNANT MELANOMA METASTATIC TO LYMPH NODE (HCC): ICD-10-CM

## 2024-09-04 PROCEDURE — 76882 US LMTD JT/FCL EVL NVASC XTR: CPT

## 2024-09-04 NOTE — TELEPHONE ENCOUNTER
Reviewed US findings with patient, 3 lymph nodes identified, around 2 cm in size but no other concerning characteristics. PET scan not covered by insurance. CT C/A/P scheduled on 9/10. Will await further imaging for additional recommendations. Follow up scheduled on 9/18 with Dr Flor.     FÉLIX Montes

## 2024-09-10 ENCOUNTER — HOSPITAL ENCOUNTER (OUTPATIENT)
Dept: CT IMAGING | Facility: HOSPITAL | Age: 54
Discharge: HOME | End: 2024-09-10
Attending: CLINICAL NURSE SPECIALIST
Payer: COMMERCIAL

## 2024-09-10 DIAGNOSIS — C43.59 MALIGNANT MELANOMA OF UPPER BACK (HCC): ICD-10-CM

## 2024-09-10 PROCEDURE — 71260 CT THORAX DX C+: CPT | Performed by: CLINICAL NURSE SPECIALIST

## 2024-09-10 PROCEDURE — 74177 CT ABD & PELVIS W/CONTRAST: CPT | Performed by: CLINICAL NURSE SPECIALIST

## 2024-09-12 ENCOUNTER — OFFICE VISIT (OUTPATIENT)
Dept: HEMATOLOGY/ONCOLOGY | Facility: HOSPITAL | Age: 54
End: 2024-09-12
Attending: SPECIALIST
Payer: COMMERCIAL

## 2024-09-12 VITALS
BODY MASS INDEX: 26.39 KG/M2 | RESPIRATION RATE: 16 BRPM | DIASTOLIC BLOOD PRESSURE: 79 MMHG | HEART RATE: 56 BPM | OXYGEN SATURATION: 96 % | SYSTOLIC BLOOD PRESSURE: 134 MMHG | WEIGHT: 190.63 LBS | TEMPERATURE: 96 F | HEIGHT: 71.1 IN

## 2024-09-12 DIAGNOSIS — C43.59 MALIGNANT MELANOMA OF UPPER BACK (HCC): Primary | ICD-10-CM

## 2024-09-12 DIAGNOSIS — C43.59 MALIGNANT MELANOMA OF UPPER BACK (HCC): ICD-10-CM

## 2024-09-12 DIAGNOSIS — Z51.12 ENCOUNTER FOR ANTINEOPLASTIC IMMUNOTHERAPY: ICD-10-CM

## 2024-09-12 DIAGNOSIS — C77.3 SECONDARY MALIGNANT NEOPLASM OF AXILLARY LYMPH NODES (HCC): Primary | ICD-10-CM

## 2024-09-12 LAB
ALBUMIN SERPL-MCNC: 4.9 G/DL (ref 3.2–4.8)
ALBUMIN/GLOB SERPL: 2 {RATIO} (ref 1–2)
ALP LIVER SERPL-CCNC: 49 U/L
ALT SERPL-CCNC: 17 U/L
ANION GAP SERPL CALC-SCNC: 6 MMOL/L (ref 0–18)
AST SERPL-CCNC: 23 U/L (ref ?–34)
BASOPHILS # BLD AUTO: 0.05 X10(3) UL (ref 0–0.2)
BASOPHILS NFR BLD AUTO: 0.6 %
BILIRUB SERPL-MCNC: 0.6 MG/DL (ref 0.3–1.2)
BUN BLD-MCNC: 22 MG/DL (ref 9–23)
CALCIUM BLD-MCNC: 9.9 MG/DL (ref 8.7–10.4)
CHLORIDE SERPL-SCNC: 105 MMOL/L (ref 98–112)
CO2 SERPL-SCNC: 27 MMOL/L (ref 21–32)
CREAT BLD-MCNC: 0.82 MG/DL
EGFRCR SERPLBLD CKD-EPI 2021: 106 ML/MIN/1.73M2 (ref 60–?)
EOSINOPHIL # BLD AUTO: 0.14 X10(3) UL (ref 0–0.7)
EOSINOPHIL NFR BLD AUTO: 1.7 %
ERYTHROCYTE [DISTWIDTH] IN BLOOD BY AUTOMATED COUNT: 11.9 %
FASTING STATUS PATIENT QL REPORTED: NO
GLOBULIN PLAS-MCNC: 2.5 G/DL (ref 2–3.5)
GLUCOSE BLD-MCNC: 93 MG/DL (ref 70–99)
HCT VFR BLD AUTO: 42.4 %
HGB BLD-MCNC: 14.3 G/DL
IMM GRANULOCYTES # BLD AUTO: 0.02 X10(3) UL (ref 0–1)
IMM GRANULOCYTES NFR BLD: 0.2 %
LYMPHOCYTES # BLD AUTO: 1.8 X10(3) UL (ref 1–4)
LYMPHOCYTES NFR BLD AUTO: 22 %
MCH RBC QN AUTO: 30.8 PG (ref 26–34)
MCHC RBC AUTO-ENTMCNC: 33.7 G/DL (ref 31–37)
MCV RBC AUTO: 91.2 FL
MONOCYTES # BLD AUTO: 0.67 X10(3) UL (ref 0.1–1)
MONOCYTES NFR BLD AUTO: 8.2 %
NEUTROPHILS # BLD AUTO: 5.51 X10 (3) UL (ref 1.5–7.7)
NEUTROPHILS # BLD AUTO: 5.51 X10(3) UL (ref 1.5–7.7)
NEUTROPHILS NFR BLD AUTO: 67.3 %
OSMOLALITY SERPL CALC.SUM OF ELEC: 289 MOSM/KG (ref 275–295)
PLATELET # BLD AUTO: 259 10(3)UL (ref 150–450)
POTASSIUM SERPL-SCNC: 4.2 MMOL/L (ref 3.5–5.1)
PROT SERPL-MCNC: 7.4 G/DL (ref 5.7–8.2)
RBC # BLD AUTO: 4.65 X10(6)UL
SODIUM SERPL-SCNC: 138 MMOL/L (ref 136–145)
T4 FREE SERPL-MCNC: 1 NG/DL (ref 0.8–1.7)
TSI SER-ACNC: 0.94 MIU/ML (ref 0.55–4.78)
WBC # BLD AUTO: 8.2 X10(3) UL (ref 4–11)

## 2024-09-12 PROCEDURE — 99215 OFFICE O/P EST HI 40 MIN: CPT | Performed by: SPECIALIST

## 2024-09-12 PROCEDURE — 96413 CHEMO IV INFUSION 1 HR: CPT

## 2024-09-12 RX ORDER — FAMOTIDINE 20 MG/1
20 TABLET, FILM COATED ORAL 2 TIMES DAILY
COMMUNITY

## 2024-09-12 NOTE — PROGRESS NOTES
Education Record    Learner:  Patient/ spouse    Disease / Diagnosis:melanoma  OTV D1C4 Nivolumab    Barriers / Limitations:  None   Comments:    Method:  Discussion   Comments:    General Topics:  Plan of care reviewed   Comments:    Outcome:  Shows understanding   Comments:   Reports last cycle was the roughest.   Fatigue started sooner and lasted longer.   Also had 3 days of severe heartburn hard time eating anything. Took rolaids, and resumed taking pepcid OTC daily.   Reviewed ways to contact the office.

## 2024-09-12 NOTE — PROGRESS NOTES
[facility] Progress Note      Patient Name: Mango Hernandez   YOB: 1970  Medical Record Number: [id number]  Attending Physician: Lacho Hodge M.D.      The 21st Century Cures Act makes medical notes like these available to patients in the interest of transparency. Please be advised this is a medical document. Medical documents are intended to carry relevant information, facts as evident, and the clinical opinion of the practitioner. The medical note is intended as peer to peer communication and may appear blunt or direct. It is written in medical language and may contain abbreviations or verbiage that are unfamiliar.      Date of Visit: 9/12/2024       Chief Complaint  Malignant melanoma of superior right back - follow up and treatment.    Oncologic History  Mango Hernandez is a 54 year old male who on 05/14/2024 underwent shave biopsy of a lesion in the right superior upper back which showed nodular melanoma with following characteristics: ulceration, 3.8 mm depth, 5 mitoses/mm2, no microsatellites, no LVI, no neurotropism, no tumor regression.     PET/CT scan showed no evidence of metastatic disease.     On 06/06/2024 he underwent wide local excision and sentinel lymph node biopsy. Pathology showed no residual melanoma at the primary site and 1 sentinel lymph node with metastasis. Patient was staged as [id number].    On 06/20/2024 he began adjuvant therapy with nivolumab.     History of Present Illness  Patient returns for scheduled follow up and treatment. He report increased fatigue and new reflux symptoms after last treatment. Currently he feels well. He was recently seen by dermatology. Stable appetite and energy level.    Performance Status   Karnofsky 100% - Normal, no complaints.     Past Medical History (historical data, reviewed by physician)   Malignant melanoma (as above); dilated cardiomyopathy; hypertension.     Past Surgical History (historical data, reviewed  by physician)   Wide local excision and sentinel lymph node biopsy for melanoma (as above); pacemaker placed and removed.     Family History (historical data, reviewed by physician)   Paternal grandmother with lung cancer; paternal grandfather with ?bone cancer; paternal aunt with cancer of unknown type; maternal grandmother with \"abdominal\" cancer.     Social History (historical data, reviewed by physician)   Previous tobacco use.     Current Medications   famotidine 20 MG Oral Tab Take 1 tablet (20 mg total) by mouth 2 (two) times daily.      carvedilol 25 MG Oral Tab Take 1 tablet (25 mg total) by mouth 2 (two) times daily with meals.      lisinopril 10 MG Oral Tab Take 1 tablet (10 mg total) by mouth daily.      spironolactone 25 MG Oral Tab Take 1 tablet (25 mg total) by mouth daily.       Allergies   Mr. Hernandez is allergic to simvastatin.     Vital Signs   /79 (BP Location: Left arm, Patient Position: Sitting, Cuff Size: large)   Pulse 56   Temp (!) 96.4 °F (35.8 °C) (Temporal)   Resp 16   Ht 1.806 m (5' 11.1\")   Wt 86.5 kg (190 lb 9.6 oz)   SpO2 96%   BMI 26.51 kg/m²     Physical Examination   Constitutional      Well developed, well nourished. Appears close to chronological age. No apparent distress.   Head                   Normocephalic and atraumatic.  Eyes   Conjunctiva clear; sclera anicteric.  ENMT                 External nose normal; external ears normal.  Neck                   Supple, without masses.  Hematologic/Lymphatic No cervical, supraclavicular, axillary lymphadenopathy.  Respiratory          Normal effort; no respiratory distress; lungs clear to auscultation bilaterally.  Cardiovascular     Regular rate and rhythm; normal S1S2.  Abdomen            Non-tender; non-distended; no masses; no fluid wave; no hepatosplenomegaly.  Back   At the site of surgical resection, there is a very mobile mass that can be manipulated to move throughout the pocket created by surgery.    Extremities          No lower extremity edema.  Neurologic           Motor and sensory grossly intact.  Psychiatric          Mood and affect appropriate.    Laboratory   Recent Results (from the past 72 hour(s))   CBC W Differential W Platelet    Collection Time: 09/12/24 10:09 AM   Result Value Ref Range    WBC 8.2 4.0 - 11.0 x10(3) uL    RBC 4.65 4.30 - 5.70 x10(6)uL    HGB 14.3 13.0 - 17.5 g/dL    HCT 42.4 39.0 - 53.0 %    .0 150.0 - 450.0 10(3)uL    MCV 91.2 80.0 - 100.0 fL    MCH 30.8 26.0 - 34.0 pg    MCHC 33.7 31.0 - 37.0 g/dL    RDW 11.9 %    Neutrophil Absolute Prelim 5.51 1.50 - 7.70 x10 (3) uL    Neutrophil Absolute 5.51 1.50 - 7.70 x10(3) uL    Lymphocyte Absolute 1.80 1.00 - 4.00 x10(3) uL    Monocyte Absolute 0.67 0.10 - 1.00 x10(3) uL    Eosinophil Absolute 0.14 0.00 - 0.70 x10(3) uL    Basophil Absolute 0.05 0.00 - 0.20 x10(3) uL    Immature Granulocyte Absolute 0.02 0.00 - 1.00 x10(3) uL    Neutrophil % 67.3 %    Lymphocyte % 22.0 %    Monocyte % 8.2 %    Eosinophil % 1.7 %    Basophil % 0.6 %    Immature Granulocyte % 0.2 %   Comp Metabolic Panel (14)    Collection Time: 09/12/24 10:09 AM   Result Value Ref Range    Glucose 93 70 - 99 mg/dL    Sodium 138 136 - 145 mmol/L    Potassium 4.2 3.5 - 5.1 mmol/L    Chloride 105 98 - 112 mmol/L    CO2 27.0 21.0 - 32.0 mmol/L    Anion Gap 6 0 - 18 mmol/L    BUN 22 9 - 23 mg/dL    Creatinine 0.82 0.70 - 1.30 mg/dL    Calcium, Total 9.9 8.7 - 10.4 mg/dL    Calculated Osmolality 289 275 - 295 mOsm/kg    eGFR-Cr 106 >=60 mL/min/1.73m2    AST 23 <34 U/L    ALT 17 10 - 49 U/L    Alkaline Phosphatase 49 45 - 117 U/L    Bilirubin, Total 0.6 0.3 - 1.2 mg/dL    Total Protein 7.4 5.7 - 8.2 g/dL    Albumin 4.9 (H) 3.2 - 4.8 g/dL    Globulin  2.5 2.0 - 3.5 g/dL    A/G Ratio 2.0 1.0 - 2.0    Patient Fasting for CMP? No    TSH [E]    Collection Time: 09/12/24 10:09 AM   Result Value Ref Range    TSH 0.936 0.550 - 4.780 mIU/mL   T4 FREE [E]    Collection Time:  09/12/24 10:09 AM   Result Value Ref Range    Free T4 1.0 0.8 - 1.7 ng/dL     Radiology  09/10/2024:  CT chest, abdomen, pelvis w contrast - I independently visualized the radiologic images in addition to reviewing the written report: There is no evidence of metastatic disease. There is a subcutaneous fluid collection in the area of the right scapula that corresponds to the finding on physical examination.     Impression and Plan   1.   Malignant melanoma of right upper back: Patient is staged as [id number]. In 06/2024, he began adjuvant systemic therapy with nivolumab.           Proceed with cycle 4 of nivolumab without modification. Continue regular follow up with dermatology.     Patient will continue to receive longitudinal care by me for the complex care required for the cancer diagnosis including the expected complications related to anticancer therapy.    Planned Follow Up   Patient will return for follow up and treatment in 4 weeks.    Electronically signed by:    Lacho Hodge M.D.  Systemic Medical Director of Oncology Services  [facility]

## 2024-09-13 ENCOUNTER — TELEPHONE (OUTPATIENT)
Dept: SURGERY | Facility: CLINIC | Age: 54
End: 2024-09-13

## 2024-09-13 NOTE — TELEPHONE ENCOUNTER
Called patient to inform him that CT was reviewed with radiologist and no abnormal or enlarged lymph nodes seen and benign appearing post op changes along surgical site. He expressed understanding. Follow up on 9/18.     FÉLIX Montes

## 2024-09-18 ENCOUNTER — OFFICE VISIT (OUTPATIENT)
Dept: SURGERY | Facility: CLINIC | Age: 54
End: 2024-09-18
Payer: COMMERCIAL

## 2024-09-18 VITALS
OXYGEN SATURATION: 100 % | SYSTOLIC BLOOD PRESSURE: 127 MMHG | BODY MASS INDEX: 26.38 KG/M2 | TEMPERATURE: 97 F | HEART RATE: 56 BPM | RESPIRATION RATE: 16 BRPM | WEIGHT: 194.81 LBS | DIASTOLIC BLOOD PRESSURE: 67 MMHG | HEIGHT: 72 IN

## 2024-09-18 DIAGNOSIS — C43.59 MALIGNANT MELANOMA OF UPPER BACK (HCC): Primary | ICD-10-CM

## 2024-09-18 DIAGNOSIS — C77.3 SECONDARY MALIGNANT NEOPLASM OF AXILLARY LYMPH NODES (HCC): ICD-10-CM

## 2024-09-18 PROCEDURE — 99213 OFFICE O/P EST LOW 20 MIN: CPT | Performed by: SURGERY

## 2024-09-18 NOTE — PROGRESS NOTES
[facility]      Patient Name:  Mango Hernandez   YOB: 1970   Gender:  Male   Appt Date:  6/20/2024   Provider:  Ian Flor MD     PATIENT PROVIDERS  Referring Provider: FÉLIX Ventura     Primary Care Provider: Liyah Han MD       CHIEF COMPLAINT  No chief complaint on file.       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 He states he has had a mole on his upper back since childhood. His wife noticed his mole was growing for the past 2-3 months. Patient denies itching, bleeding or tenderness. Denies weight loss. Patient does mention some \"bumps\" on the right armpit, states the size of these \"bumps\" fluctuates through out the day. Patient does enjoy outdoor activities. Denies history of sunburns. States he applies sunscreen with activities.      Shave biopsy --> Right Superior Lateral Upper Back: Malignant Melanoma (3.8mm)      6/6/2024: wide excision with sentinel lymph node biopsy --> 3.8 mm ulcerated, 1/1 lymph node positive for melanoma     Receiving adjuvant Nivo with Dr. Hodge.       Medications Reviewed:    Current Outpatient Medications:     famotidine 20 MG Oral Tab, Take 1 tablet (20 mg total) by mouth 2 (two) times daily., Disp: , Rfl:     carvedilol 25 MG Oral Tab, Take 1 tablet (25 mg total) by mouth 2 (two) times daily with meals., Disp: , Rfl:     lisinopril 10 MG Oral Tab, Take 1 tablet (10 mg total) by mouth daily., Disp: , Rfl:     spironolactone 25 MG  Oral Tab, Take 1 tablet (25 mg total) by mouth daily., Disp: , Rfl:      Allergies Reviewed:  Allergies   Allergen Reactions    Simvastatin OTHER (SEE COMMENTS)     Statin myopathy        History:  Reviewed:  Past Medical History:    Cardiomyopathy (HCC)    Congestive heart disease (HCC)    Dilated cardiomyopathy (HCC)    hx of pacemaker    High blood pressure    Hypertension    Knee pain    Lumbar pain    Visual impairment    Vitamin D deficiency      Reviewed:  Past Surgical History:   Procedure Laterality Date    Colonoscopy  2022    Other surgical history Right     Wide excision with sentinel node (R upper back)    Pacemaker clinic, cardio (internal)      2005 and 2014. Removed 3/2023    Skin biopsy        Reviewed Social History:  Social History     Socioeconomic History    Marital status:    Tobacco Use    Smoking status: Former     Types: Cigarettes    Smokeless tobacco: Never    Tobacco comments:     Quit 2005   Vaping Use    Vaping status: Former   Substance and Sexual Activity    Alcohol use: Not Currently     Comment: occasionally    Drug use: Yes     Types: Cannabis     Comment: 5-7 to sleep      Reviewed:  Family History   Problem Relation Age of Onset    Cancer Maternal Grandmother     Heart Disorder Maternal Grandfather     Cancer Paternal Grandmother     Cancer Paternal Grandfather     Cancer Other       Review of Systems:  Patient reports no rapid or irregular heartbeat. He reports no chest pain. He reports no nausea. He reports no vomiting. He reports no diarrhea. He reports no constipation. He reports no bright red blood per rectum. He reports no fatigue. He reports no blood in the urine, no changes in urinary habits: initiating urination requires straining, no changes in urinary habits: delays in starting hesitancy, and no change in urine appearance. He reports no headache. He reports no dizziness. He reports no easy bruising tendency. He reports no diffuse joint pains. He reports no bone  pain. He reports no back pain. He reports no swollen glands. He reports no numbness. He reports no shortness of breath. He reports no change in a mole. He reports  No recent change in weight, no fever, no chills, and no sweating heavily at night       Physical Examination:  Constitutional:  NAD.   Eyes: non-icteric.    Lymph nodes: No cervical, supraclavicular, axillary, or inguinal lymphadenopathy.  Abdomen: Soft, no masses.  Musculoskeletal: no edema.   Skin: The scalp an remainder of the skin do not show any evidence for new or suspicious lesions. The wide excision site is well healed. There is no evidence for local recurrence. There are no intransit metastases.     Document Review:  U/S right axilla: No suspicious lymph nodes.  Recent CT unremarkable     Procedure(s):  Ultrasound of smooth mobile soft tissue nodule resection site reveals fluid.     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  - Continue immunotherapy and imaging per Dr. Hodge.  - Continue monthly self skin examination.  - Return to clinic 4 months with ultrasound right axilla.      Follow Up:  4 months for skin check and US       Electronically Signed by:     Ian Flor MD

## 2024-10-09 NOTE — PROGRESS NOTES
[facility] Progress Note      Patient Name: Mango Hernandez   YOB: 1970  Medical Record Number: [id number]  Attending Physician: Lacho Hodge M.D.      The 21st Century Cures Act makes medical notes like these available to patients in the interest of transparency. Please be advised this is a medical document. Medical documents are intended to carry relevant information, facts as evident, and the clinical opinion of the practitioner. The medical note is intended as peer to peer communication and may appear blunt or direct. It is written in medical language and may contain abbreviations or verbiage that are unfamiliar.      Date of Visit: 10/10/2024       Chief Complaint  Malignant melanoma of superior right back - follow up and treatment.    Oncologic History  Mango Hernanedz is a 54 year old male who on 05/14/2024 underwent shave biopsy of a lesion in the right superior upper back which showed nodular melanoma with following characteristics: ulceration, 3.8 mm depth, 5 mitoses/mm2, no microsatellites, no LVI, no neurotropism, no tumor regression.     PET/CT scan showed no evidence of metastatic disease.     On 06/06/2024 he underwent wide local excision and sentinel lymph node biopsy. Pathology showed no residual melanoma at the primary site and 1 sentinel lymph node with metastasis. Patient was staged as [id number].    On 06/20/2024 he began adjuvant therapy with nivolumab.     History of Present Illness  Patient returns for scheduled follow up and treatment. He has no complaints.     Performance Status   Karnofsky 100% - Normal, no complaints.     Past Medical History (historical data, reviewed by physician)   Malignant melanoma (as above); dilated cardiomyopathy; hypertension.     Past Surgical History (historical data, reviewed by physician)   Wide local excision and sentinel lymph node biopsy for melanoma (as above); pacemaker placed and removed.     Family History  (historical data, reviewed by physician)   Paternal grandmother with lung cancer; paternal grandfather with ?bone cancer; paternal aunt with cancer of unknown type; maternal grandmother with \"abdominal\" cancer.     Social History (historical data, reviewed by physician)   Previous tobacco use.     Current Medications   famotidine 20 MG Oral Tab Take 1 tablet (20 mg total) by mouth 2 (two) times daily.      carvedilol 25 MG Oral Tab Take 1 tablet (25 mg total) by mouth 2 (two) times daily with meals.      lisinopril 10 MG Oral Tab Take 1 tablet (10 mg total) by mouth daily.      spironolactone 25 MG Oral Tab Take 1 tablet (25 mg total) by mouth daily.       Allergies   Mr. Hernandez is allergic to simvastatin.     Vital Signs   /79 (BP Location: Left arm, Patient Position: Sitting, Cuff Size: large)   Pulse 69   Temp 97.6 °F (36.4 °C) (Temporal)   Resp 14   Ht 1.806 m (5' 11.1\")   Wt 89.6 kg (197 lb 9.6 oz)   SpO2 97%   BMI 27.48 kg/m²     Physical Examination   Constitutional      Well developed, well nourished. Appears close to chronological age. No apparent distress.   Head                   Normocephalic and atraumatic.  Eyes   Conjunctiva clear; sclera anicteric.  ENMT                 External nose normal; external ears normal.  Neck                   Supple, without masses.  Hematologic/Lymphatic No cervical, supraclavicular, axillary lymphadenopathy.  Respiratory          Normal effort; no respiratory distress; lungs clear to auscultation bilaterally.  Cardiovascular     Regular rate and rhythm; normal S1S2.  Abdomen            Non-tender; non-distended; no masses; no fluid wave; no hepatosplenomegaly.  Back   At the site of surgical resection, there is small mobile mass that can be manipulated to move throughout the pocket created by surgery consistent with seroma seen on CT.   Extremities          No lower extremity edema.  Neurologic           Motor and sensory grossly intact.  Psychiatric           Mood and affect appropriate.    Laboratory   Recent Results (from the past 72 hours)   CBC W Differential W Platelet    Collection Time: 10/10/24  9:24 AM   Result Value Ref Range    WBC 9.6 4.0 - 11.0 x10(3) uL    RBC 4.40 4.30 - 5.70 x10(6)uL    HGB 13.4 13.0 - 17.5 g/dL    HCT 39.7 39.0 - 53.0 %    .0 150.0 - 450.0 10(3)uL    MCV 90.2 80.0 - 100.0 fL    MCH 30.5 26.0 - 34.0 pg    MCHC 33.8 31.0 - 37.0 g/dL    RDW 11.9 %    Neutrophil Absolute Prelim 7.20 1.50 - 7.70 x10 (3) uL    Neutrophil Absolute 7.20 1.50 - 7.70 x10(3) uL    Lymphocyte Absolute 1.61 1.00 - 4.00 x10(3) uL    Monocyte Absolute 0.57 0.10 - 1.00 x10(3) uL    Eosinophil Absolute 0.11 0.00 - 0.70 x10(3) uL    Basophil Absolute 0.04 0.00 - 0.20 x10(3) uL    Immature Granulocyte Absolute 0.03 0.00 - 1.00 x10(3) uL    Neutrophil % 75.3 %    Lymphocyte % 16.8 %    Monocyte % 6.0 %    Eosinophil % 1.2 %    Basophil % 0.4 %    Immature Granulocyte % 0.3 %   Comp Metabolic Panel (14)    Collection Time: 10/10/24  9:24 AM   Result Value Ref Range    Glucose 80 70 - 99 mg/dL    Sodium 138 136 - 145 mmol/L    Potassium 3.8 3.5 - 5.1 mmol/L    Chloride 105 98 - 112 mmol/L    CO2 29.0 21.0 - 32.0 mmol/L    Anion Gap 4 0 - 18 mmol/L    BUN 23 9 - 23 mg/dL    Creatinine 0.80 0.70 - 1.30 mg/dL    Calcium, Total 9.7 8.7 - 10.4 mg/dL    Calculated Osmolality 289 275 - 295 mOsm/kg    eGFR-Cr 106 >=60 mL/min/1.73m2    AST 26 <34 U/L    ALT 21 10 - 49 U/L    Alkaline Phosphatase 44 (L) 45 - 117 U/L    Bilirubin, Total 0.4 0.3 - 1.2 mg/dL    Total Protein 7.1 5.7 - 8.2 g/dL    Albumin 4.8 3.2 - 4.8 g/dL    Globulin  2.3 2.0 - 3.5 g/dL    A/G Ratio 2.1 (H) 1.0 - 2.0    Patient Fasting for CMP? Patient not present    TSH [E]    Collection Time: 10/10/24  9:24 AM   Result Value Ref Range    TSH 0.527 (L) 0.550 - 4.780 mIU/mL   T4 FREE [E]    Collection Time: 10/10/24  9:24 AM   Result Value Ref Range    Free T4 1.3 0.8 - 1.7 ng/dL     Impression and Plan   1.    Malignant melanoma of right upper back: Patient is staged as [id number]. In 06/2024, he began adjuvant systemic therapy with nivolumab.           Proceed with cycle 5 of nivolumab without modification. Continue regular follow up with dermatology. Next imaging due in 12/2024.     2.   Low TSH: May be due to immune mediated thyroid disease but free T4 is normal. Patient advised about signs/symptoms of hyperthyroidism.     Planned Follow Up   Patient will return for follow up and treatment in 4 weeks.    Electronically signed by:    Lacho Hodge M.D.  Systemic Medical Director of Oncology Services  [facility]

## 2024-10-10 ENCOUNTER — OFFICE VISIT (OUTPATIENT)
Dept: HEMATOLOGY/ONCOLOGY | Facility: HOSPITAL | Age: 54
End: 2024-10-10
Attending: SPECIALIST
Payer: COMMERCIAL

## 2024-10-10 VITALS
BODY MASS INDEX: 27.36 KG/M2 | HEIGHT: 71.1 IN | WEIGHT: 197.63 LBS | TEMPERATURE: 98 F | DIASTOLIC BLOOD PRESSURE: 79 MMHG | HEART RATE: 69 BPM | SYSTOLIC BLOOD PRESSURE: 122 MMHG | RESPIRATION RATE: 14 BRPM | OXYGEN SATURATION: 97 %

## 2024-10-10 DIAGNOSIS — C43.59 MALIGNANT MELANOMA OF UPPER BACK (HCC): Primary | ICD-10-CM

## 2024-10-10 DIAGNOSIS — C43.59 MALIGNANT MELANOMA OF UPPER BACK (HCC): ICD-10-CM

## 2024-10-10 DIAGNOSIS — Z51.12 ENCOUNTER FOR ANTINEOPLASTIC IMMUNOTHERAPY: ICD-10-CM

## 2024-10-10 DIAGNOSIS — R79.89 LOW TSH LEVEL: ICD-10-CM

## 2024-10-10 DIAGNOSIS — C77.3 SECONDARY MALIGNANT NEOPLASM OF AXILLARY LYMPH NODES (HCC): Primary | ICD-10-CM

## 2024-10-10 LAB
ALBUMIN SERPL-MCNC: 4.8 G/DL (ref 3.2–4.8)
ALBUMIN/GLOB SERPL: 2.1 {RATIO} (ref 1–2)
ALP LIVER SERPL-CCNC: 44 U/L
ALT SERPL-CCNC: 21 U/L
ANION GAP SERPL CALC-SCNC: 4 MMOL/L (ref 0–18)
AST SERPL-CCNC: 26 U/L (ref ?–34)
BASOPHILS # BLD AUTO: 0.04 X10(3) UL (ref 0–0.2)
BASOPHILS NFR BLD AUTO: 0.4 %
BILIRUB SERPL-MCNC: 0.4 MG/DL (ref 0.3–1.2)
BUN BLD-MCNC: 23 MG/DL (ref 9–23)
CALCIUM BLD-MCNC: 9.7 MG/DL (ref 8.7–10.4)
CHLORIDE SERPL-SCNC: 105 MMOL/L (ref 98–112)
CO2 SERPL-SCNC: 29 MMOL/L (ref 21–32)
CREAT BLD-MCNC: 0.8 MG/DL
EGFRCR SERPLBLD CKD-EPI 2021: 106 ML/MIN/1.73M2 (ref 60–?)
EOSINOPHIL # BLD AUTO: 0.11 X10(3) UL (ref 0–0.7)
EOSINOPHIL NFR BLD AUTO: 1.2 %
ERYTHROCYTE [DISTWIDTH] IN BLOOD BY AUTOMATED COUNT: 11.9 %
GLOBULIN PLAS-MCNC: 2.3 G/DL (ref 2–3.5)
GLUCOSE BLD-MCNC: 80 MG/DL (ref 70–99)
HCT VFR BLD AUTO: 39.7 %
HGB BLD-MCNC: 13.4 G/DL
IMM GRANULOCYTES # BLD AUTO: 0.03 X10(3) UL (ref 0–1)
IMM GRANULOCYTES NFR BLD: 0.3 %
LYMPHOCYTES # BLD AUTO: 1.61 X10(3) UL (ref 1–4)
LYMPHOCYTES NFR BLD AUTO: 16.8 %
MCH RBC QN AUTO: 30.5 PG (ref 26–34)
MCHC RBC AUTO-ENTMCNC: 33.8 G/DL (ref 31–37)
MCV RBC AUTO: 90.2 FL
MONOCYTES # BLD AUTO: 0.57 X10(3) UL (ref 0.1–1)
MONOCYTES NFR BLD AUTO: 6 %
NEUTROPHILS # BLD AUTO: 7.2 X10 (3) UL (ref 1.5–7.7)
NEUTROPHILS # BLD AUTO: 7.2 X10(3) UL (ref 1.5–7.7)
NEUTROPHILS NFR BLD AUTO: 75.3 %
OSMOLALITY SERPL CALC.SUM OF ELEC: 289 MOSM/KG (ref 275–295)
PLATELET # BLD AUTO: 239 10(3)UL (ref 150–450)
POTASSIUM SERPL-SCNC: 3.8 MMOL/L (ref 3.5–5.1)
PROT SERPL-MCNC: 7.1 G/DL (ref 5.7–8.2)
RBC # BLD AUTO: 4.4 X10(6)UL
SODIUM SERPL-SCNC: 138 MMOL/L (ref 136–145)
T4 FREE SERPL-MCNC: 1.3 NG/DL (ref 0.8–1.7)
TSI SER-ACNC: 0.53 MIU/ML (ref 0.55–4.78)
WBC # BLD AUTO: 9.6 X10(3) UL (ref 4–11)

## 2024-10-10 PROCEDURE — 99215 OFFICE O/P EST HI 40 MIN: CPT | Performed by: SPECIALIST

## 2024-10-10 PROCEDURE — 96413 CHEMO IV INFUSION 1 HR: CPT

## 2024-10-10 NOTE — PROGRESS NOTES
Pt here here C5D1 Opdivo. Pt states last cycle went great - pt reported he had no side effects with this last cycle. Appetite and energy levels are good. Pt has been taking pepcid daily, which has controlled acid reflux.       Education Record    Learner:  Patient and Spouse    Disease / Diagnosis: malignant melanoma of upper back    Barriers / Limitations:  None   Comments:    Method:  Discussion   Comments:    General Topics:  Medication, Pain, Side effects and symptom management, and Plan of care reviewed   Comments:    Outcome:  Observed demonstration and Shows understanding   Comments:

## 2024-10-10 NOTE — PROGRESS NOTES
Pt here for C5D1 Drug(s)opdivo.  Arrives Ambulating independently, accompanied by Spouse     Patient was evaluated today by MD.    Oral medications included in this regimen:  no    Patient confirms comprehension of cancer treatment schedule:  yes    Pregnancy screening:  Not applicable    Modifications in dose or schedule:  No    Medications appearance and physical integrity checked by RN: yes.    Chemotherapy IV pump settings verified by 2 RNs:  No due to targeted therapy IV administration.  Frequency of blood return and site check throughout administration: Prior to administration     Infusion/treatment outcome:  patient tolerated treatment without incident    Education Record    Learner:  Patient  Barriers / Limitations:  None  Method:  Discussion  Education / instructions given:  plan of care  Outcome:  Shows understanding    Discharged Home, Ambulating independently, accompanied by:Spouse    Patient/family verbalized understanding of future appointments: by [employer] messaging    Patient in treatment center for  labs, MD, and treatment. Pt tolerated without issue, is aware of next appointment date/time, and left in stable condition.

## 2024-11-06 NOTE — PROGRESS NOTES
[facility] Progress Note      Patient Name: Mango Hernandez   YOB: 1970  Medical Record Number: [id number]  Attending Physician: Lacho Hodge M.D.      The 21st Century Cures Act makes medical notes like these available to patients in the interest of transparency. Please be advised this is a medical document. Medical documents are intended to carry relevant information, facts as evident, and the clinical opinion of the practitioner. The medical note is intended as peer to peer communication and may appear blunt or direct. It is written in medical language and may contain abbreviations or verbiage that are unfamiliar.      Date of Visit: 11/7/2024       Chief Complaint  Malignant melanoma of superior right back - follow up and treatment.    Oncologic History  Mango Hernandez is a 54 year old male who on 05/14/2024 underwent shave biopsy of a lesion in the right superior upper back which showed nodular melanoma with following characteristics: ulceration, 3.8 mm depth, 5 mitoses/mm2, no microsatellites, no LVI, no neurotropism, no tumor regression.     PET/CT scan showed no evidence of metastatic disease.     On 06/06/2024 he underwent wide local excision and sentinel lymph node biopsy. Pathology showed no residual melanoma at the primary site and 1 sentinel lymph node with metastasis. Patient was staged as [id number].    On 06/20/2024 he began adjuvant therapy with nivolumab.     History of Present Illness  Patient returns for scheduled follow up and treatment. He has no complaints.     Performance Status   Karnofsky 100% - Normal, no complaints.     Past Medical History (historical data, reviewed by physician)   Malignant melanoma (as above); dilated cardiomyopathy; hypertension.     Past Surgical History (historical data, reviewed by physician)   Wide local excision and sentinel lymph node biopsy for melanoma (as above); pacemaker placed and removed.     Family History  (historical data, reviewed by physician)   Paternal grandmother with lung cancer; paternal grandfather with ?bone cancer; paternal aunt with cancer of unknown type; maternal grandmother with \"abdominal\" cancer.     Social History (historical data, reviewed by physician)   Previous tobacco use.     Current Medications   famotidine 20 MG Oral Tab Take 1 tablet (20 mg total) by mouth 2 (two) times daily.      carvedilol 25 MG Oral Tab Take 1 tablet (25 mg total) by mouth 2 (two) times daily with meals.      lisinopril 10 MG Oral Tab Take 1 tablet (10 mg total) by mouth daily.      spironolactone 25 MG Oral Tab Take 1 tablet (25 mg total) by mouth daily.       Allergies   Mr. Hernandez is allergic to simvastatin.     Vital Signs   /72 (BP Location: Left arm, Patient Position: Sitting, Cuff Size: large)   Pulse (!) 48   Temp 97.4 °F (36.3 °C) (Temporal)   Resp 16   Ht 1.806 m (5' 11.1\")   Wt 89.8 kg (198 lb)   SpO2 98%   BMI 27.54 kg/m²     Physical Examination   Constitutional      Well developed, well nourished. Appears close to chronological age. No apparent distress.   Head                   Normocephalic and atraumatic.  Eyes   Conjunctiva clear; sclera anicteric.  ENMT                 External nose normal; external ears normal.  Neck                   Supple, without masses.  Hematologic/Lymphatic No cervical, supraclavicular, axillary lymphadenopathy.  Respiratory          Normal effort; no respiratory distress; lungs clear to auscultation bilaterally.  Cardiovascular     Regular rate and rhythm; normal S1S2.  Abdomen            Non-tender; non-distended; no masses; no fluid wave; no hepatosplenomegaly.  Extremities          No lower extremity edema.  Neurologic           Motor and sensory grossly intact.  Psychiatric          Mood and affect appropriate.    Laboratory   Recent Results (from the past week)   CBC W Differential W Platelet    Collection Time: 11/07/24  9:10 AM   Result Value Ref Range     WBC 8.4 4.0 - 11.0 x10(3) uL    RBC 4.39 4.30 - 5.70 x10(6)uL    HGB 13.8 13.0 - 17.5 g/dL    HCT 40.3 39.0 - 53.0 %    .0 150.0 - 450.0 10(3)uL    MCV 91.8 80.0 - 100.0 fL    MCH 31.4 26.0 - 34.0 pg    MCHC 34.2 31.0 - 37.0 g/dL    RDW 12.3 %    Neutrophil Absolute Prelim 5.97 1.50 - 7.70 x10 (3) uL    Neutrophil Absolute 5.97 1.50 - 7.70 x10(3) uL    Lymphocyte Absolute 1.59 1.00 - 4.00 x10(3) uL    Monocyte Absolute 0.67 0.10 - 1.00 x10(3) uL    Eosinophil Absolute 0.14 0.00 - 0.70 x10(3) uL    Basophil Absolute 0.04 0.00 - 0.20 x10(3) uL    Immature Granulocyte Absolute 0.02 0.00 - 1.00 x10(3) uL    Neutrophil % 70.8 %    Lymphocyte % 18.9 %    Monocyte % 7.9 %    Eosinophil % 1.7 %    Basophil % 0.5 %    Immature Granulocyte % 0.2 %   Comp Metabolic Panel (14)    Collection Time: 11/07/24  9:10 AM   Result Value Ref Range    Glucose 100 (H) 70 - 99 mg/dL    Sodium 137 136 - 145 mmol/L    Potassium 4.3 3.5 - 5.1 mmol/L    Chloride 105 98 - 112 mmol/L    CO2 29.0 21.0 - 32.0 mmol/L    Anion Gap 3 0 - 18 mmol/L    BUN 23 9 - 23 mg/dL    Creatinine 0.85 0.70 - 1.30 mg/dL    Calcium, Total 9.7 8.7 - 10.4 mg/dL    Calculated Osmolality 288 275 - 295 mOsm/kg    eGFR-Cr 105 >=60 mL/min/1.73m2    AST 26 <34 U/L    ALT 19 10 - 49 U/L    Alkaline Phosphatase 41 (L) 45 - 117 U/L    Bilirubin, Total 0.5 0.3 - 1.2 mg/dL    Total Protein 6.8 5.7 - 8.2 g/dL    Albumin 4.8 3.2 - 4.8 g/dL    Globulin  2.0 2.0 - 3.5 g/dL    A/G Ratio 2.4 (H) 1.0 - 2.0    Patient Fasting for CMP? Patient not present    TSH [E]    Collection Time: 11/07/24  9:10 AM   Result Value Ref Range    TSH 0.697 0.550 - 4.780 uIU/mL   T4 FREE [E]    Collection Time: 11/07/24  9:10 AM   Result Value Ref Range    Free T4 1.2 0.8 - 1.7 ng/dL     Impression and Plan   1.   Malignant melanoma of right upper back: Patient is staged as [id number]. In 06/2024, he began adjuvant systemic therapy with nivolumab.           Proceed with cycle 6 of nivolumab without  modification. Continue regular follow up with dermatology. CT imaging ordered for this end of this cycle.    Planned Follow Up   Patient will return for follow up and treatment in 4 weeks.    Electronically signed by:    Lcaho Hodge M.D.  Systemic Medical Director of Oncology Services  [facility]

## 2024-11-07 ENCOUNTER — OFFICE VISIT (OUTPATIENT)
Dept: HEMATOLOGY/ONCOLOGY | Facility: HOSPITAL | Age: 54
End: 2024-11-07
Attending: SPECIALIST
Payer: COMMERCIAL

## 2024-11-07 VITALS
TEMPERATURE: 97 F | HEART RATE: 48 BPM | RESPIRATION RATE: 16 BRPM | SYSTOLIC BLOOD PRESSURE: 122 MMHG | BODY MASS INDEX: 27.41 KG/M2 | OXYGEN SATURATION: 98 % | DIASTOLIC BLOOD PRESSURE: 72 MMHG | HEIGHT: 71.1 IN | WEIGHT: 198 LBS

## 2024-11-07 DIAGNOSIS — C77.3 SECONDARY MALIGNANT NEOPLASM OF AXILLARY LYMPH NODES (HCC): ICD-10-CM

## 2024-11-07 DIAGNOSIS — C43.59 MALIGNANT MELANOMA OF UPPER BACK (HCC): Primary | ICD-10-CM

## 2024-11-07 DIAGNOSIS — Z51.12 ENCOUNTER FOR ANTINEOPLASTIC IMMUNOTHERAPY: ICD-10-CM

## 2024-11-07 LAB
ALBUMIN SERPL-MCNC: 4.8 G/DL (ref 3.2–4.8)
ALBUMIN/GLOB SERPL: 2.4 {RATIO} (ref 1–2)
ALP LIVER SERPL-CCNC: 41 U/L
ALT SERPL-CCNC: 19 U/L
ANION GAP SERPL CALC-SCNC: 3 MMOL/L (ref 0–18)
AST SERPL-CCNC: 26 U/L (ref ?–34)
BASOPHILS # BLD AUTO: 0.04 X10(3) UL (ref 0–0.2)
BASOPHILS NFR BLD AUTO: 0.5 %
BILIRUB SERPL-MCNC: 0.5 MG/DL (ref 0.3–1.2)
BUN BLD-MCNC: 23 MG/DL (ref 9–23)
CALCIUM BLD-MCNC: 9.7 MG/DL (ref 8.7–10.4)
CHLORIDE SERPL-SCNC: 105 MMOL/L (ref 98–112)
CO2 SERPL-SCNC: 29 MMOL/L (ref 21–32)
CREAT BLD-MCNC: 0.85 MG/DL
EGFRCR SERPLBLD CKD-EPI 2021: 105 ML/MIN/1.73M2 (ref 60–?)
EOSINOPHIL # BLD AUTO: 0.14 X10(3) UL (ref 0–0.7)
EOSINOPHIL NFR BLD AUTO: 1.7 %
ERYTHROCYTE [DISTWIDTH] IN BLOOD BY AUTOMATED COUNT: 12.3 %
GLOBULIN PLAS-MCNC: 2 G/DL (ref 2–3.5)
GLUCOSE BLD-MCNC: 100 MG/DL (ref 70–99)
HCT VFR BLD AUTO: 40.3 %
HGB BLD-MCNC: 13.8 G/DL
IMM GRANULOCYTES # BLD AUTO: 0.02 X10(3) UL (ref 0–1)
IMM GRANULOCYTES NFR BLD: 0.2 %
LYMPHOCYTES # BLD AUTO: 1.59 X10(3) UL (ref 1–4)
LYMPHOCYTES NFR BLD AUTO: 18.9 %
MCH RBC QN AUTO: 31.4 PG (ref 26–34)
MCHC RBC AUTO-ENTMCNC: 34.2 G/DL (ref 31–37)
MCV RBC AUTO: 91.8 FL
MONOCYTES # BLD AUTO: 0.67 X10(3) UL (ref 0.1–1)
MONOCYTES NFR BLD AUTO: 7.9 %
NEUTROPHILS # BLD AUTO: 5.97 X10 (3) UL (ref 1.5–7.7)
NEUTROPHILS # BLD AUTO: 5.97 X10(3) UL (ref 1.5–7.7)
NEUTROPHILS NFR BLD AUTO: 70.8 %
OSMOLALITY SERPL CALC.SUM OF ELEC: 288 MOSM/KG (ref 275–295)
PLATELET # BLD AUTO: 238 10(3)UL (ref 150–450)
POTASSIUM SERPL-SCNC: 4.3 MMOL/L (ref 3.5–5.1)
PROT SERPL-MCNC: 6.8 G/DL (ref 5.7–8.2)
RBC # BLD AUTO: 4.39 X10(6)UL
SODIUM SERPL-SCNC: 137 MMOL/L (ref 136–145)
T4 FREE SERPL-MCNC: 1.2 NG/DL (ref 0.8–1.7)
TSI SER-ACNC: 0.7 UIU/ML (ref 0.55–4.78)
WBC # BLD AUTO: 8.4 X10(3) UL (ref 4–11)

## 2024-11-07 PROCEDURE — 84443 ASSAY THYROID STIM HORMONE: CPT | Performed by: SPECIALIST

## 2024-11-07 PROCEDURE — 84439 ASSAY OF FREE THYROXINE: CPT | Performed by: SPECIALIST

## 2024-11-07 PROCEDURE — 85025 COMPLETE CBC W/AUTO DIFF WBC: CPT | Performed by: SPECIALIST

## 2024-11-07 PROCEDURE — 96413 CHEMO IV INFUSION 1 HR: CPT

## 2024-11-07 PROCEDURE — 80053 COMPREHEN METABOLIC PANEL: CPT | Performed by: SPECIALIST

## 2024-11-07 PROCEDURE — 36415 COLL VENOUS BLD VENIPUNCTURE: CPT

## 2024-11-07 NOTE — PROGRESS NOTES
Pt here for C6D1 Opdivo. Pt has no complaints this morning, states last cycle pt had no issues.     Education Record    Learner:  Patient and Spouse    Disease / Diagnosis: Melanoma    Barriers / Limitations:  None   Comments:    Method:  Discussion   Comments:    General Topics:  Medication, Side effects and symptom management, and Plan of care reviewed   Comments:    Outcome:  Observed demonstration and Shows understanding   Comments:

## 2024-11-07 NOTE — PROGRESS NOTES
Pt here for C6D1 Drug(s)opdivo.  Arrives Ambulating independently, accompanied by Spouse     Patient was evaluated today by MD.    Oral medications included in this regimen:  no    Patient confirms comprehension of cancer treatment schedule:  yes    Pregnancy screening:  Not applicable    Modifications in dose or schedule:  Yes    Medications appearance and physical integrity checked by RN: yes.    Chemotherapy IV pump settings verified by 2 RNs:  No due to targeted therapy IV administration.  Frequency of blood return and site check throughout administration: Prior to administration     Infusion/treatment outcome:  patient tolerated treatment without incident    Education Record    Learner:  Patient  Barriers / Limitations:  None  Method:  Brief focused  Education / instructions given:  poc  Outcome:  Shows understanding    Discharged Home, Ambulating independently, accompanied by:Spouse    Patient/family verbalized understanding of future appointments: by [employer] messaging    Pt received opdivo without complication.  Pt discharged in stable condition.

## 2024-11-29 NOTE — PROGRESS NOTES
[facility] Progress Note      Patient Name: Mango Hernandez   YOB: 1970  Medical Record Number: [id number]  Attending Physician: Lacho Hodge M.D.      The 21st Century Cures Act makes medical notes like these available to patients in the interest of transparency. Please be advised this is a medical document. Medical documents are intended to carry relevant information, facts as evident, and the clinical opinion of the practitioner. The medical note is intended as peer to peer communication and may appear blunt or direct. It is written in medical language and may contain abbreviations or verbiage that are unfamiliar.      Date of Visit: 12/5/2024       Chief Complaint  Malignant melanoma of superior right back - follow up and treatment.    Oncologic History  Mango Hernandez is a 54 year old male who on 05/14/2024 underwent shave biopsy of a lesion in the right superior upper back which showed nodular melanoma with following characteristics: ulceration, 3.8 mm depth, 5 mitoses/mm2, no microsatellites, no LVI, no neurotropism, no tumor regression.     PET/CT scan showed no evidence of metastatic disease.     On 06/06/2024 he underwent wide local excision and sentinel lymph node biopsy. Pathology showed no residual melanoma at the primary site and 1 sentinel lymph node with metastasis. Patient was staged as [id number].    On 06/20/2024 he began adjuvant therapy with nivolumab.     History of Present Illness  Patient returns for scheduled follow up and treatment. He has no complaints.     Performance Status   Karnofsky 100% - Normal, no complaints.     Past Medical History (historical data, reviewed by physician)   Malignant melanoma (as above); dilated cardiomyopathy; hypertension.     Past Surgical History (historical data, reviewed by physician)   Wide local excision and sentinel lymph node biopsy for melanoma (as above); pacemaker placed and removed.     Family History  (historical data, reviewed by physician)   Paternal grandmother with lung cancer; paternal grandfather with ?bone cancer; paternal aunt with cancer of unknown type; maternal grandmother with \"abdominal\" cancer.     Social History (historical data, reviewed by physician)   Previous tobacco use.     Current Medications   famotidine 20 MG Oral Tab Take 1 tablet (20 mg total) by mouth 2 (two) times daily.      carvedilol 25 MG Oral Tab Take 1 tablet (25 mg total) by mouth 2 (two) times daily with meals.      lisinopril 10 MG Oral Tab Take 1 tablet (10 mg total) by mouth daily.      spironolactone 25 MG Oral Tab Take 1 tablet (25 mg total) by mouth daily.       Allergies   Mr. Hernandez is allergic to simvastatin.     Vital Signs   /79 (BP Location: Left arm, Patient Position: Sitting, Cuff Size: large)   Pulse 55   Temp 97.8 °F (36.6 °C) (Temporal)   Resp 16   Ht 1.806 m (5' 11.1\")   Wt 88.9 kg (196 lb)   SpO2 99%   BMI 27.26 kg/m²     Physical Examination   Constitutional      Well developed, well nourished. Appears close to chronological age. No apparent distress.   Head                   Normocephalic and atraumatic.  Eyes   Conjunctiva clear; sclera anicteric.  ENMT                 External nose normal; external ears normal.  Neck                   Supple, without masses.  Lymphatic  No cervical, supraclavicular, axillary lymphadenopathy.  Respiratory          Normal effort; no respiratory distress; lungs clear to auscultation bilaterally.  Cardiovascular     Regular rate and rhythm; normal S1S2.  Abdomen            Non-tender; non-distended; no masses; no fluid wave; no hepatosplenomegaly.  Extremities          No lower extremity edema.  Neurologic           Motor and sensory grossly intact.  Psychiatric          Mood and affect appropriate.    Laboratory   Recent Results (from the past week)   CBC W Differential W Platelet    Collection Time: 12/05/24  9:02 AM   Result Value Ref Range    WBC 7.1 4.0 -  11.0 x10(3) uL    RBC 4.60 4.30 - 5.70 x10(6)uL    HGB 14.3 13.0 - 17.5 g/dL    HCT 42.5 39.0 - 53.0 %    .0 150.0 - 450.0 10(3)uL    MCV 92.4 80.0 - 100.0 fL    MCH 31.1 26.0 - 34.0 pg    MCHC 33.6 31.0 - 37.0 g/dL    RDW 12.3 %    Neutrophil Absolute Prelim 4.66 1.50 - 7.70 x10 (3) uL    Neutrophil Absolute 4.66 1.50 - 7.70 x10(3) uL    Lymphocyte Absolute 1.65 1.00 - 4.00 x10(3) uL    Monocyte Absolute 0.63 0.10 - 1.00 x10(3) uL    Eosinophil Absolute 0.12 0.00 - 0.70 x10(3) uL    Basophil Absolute 0.04 0.00 - 0.20 x10(3) uL    Immature Granulocyte Absolute 0.02 0.00 - 1.00 x10(3) uL    Neutrophil % 65.4 %    Lymphocyte % 23.2 %    Monocyte % 8.8 %    Eosinophil % 1.7 %    Basophil % 0.6 %    Immature Granulocyte % 0.3 %   Comp Metabolic Panel (14)    Collection Time: 12/05/24  9:02 AM   Result Value Ref Range    Glucose 99 70 - 99 mg/dL    Sodium 139 136 - 145 mmol/L    Potassium 4.4 3.5 - 5.1 mmol/L    Chloride 104 98 - 112 mmol/L    CO2 27.0 21.0 - 32.0 mmol/L    Anion Gap 8 0 - 18 mmol/L    BUN 20 9 - 23 mg/dL    Creatinine 0.77 0.70 - 1.30 mg/dL    Calcium, Total 9.9 8.7 - 10.4 mg/dL    Calculated Osmolality 291 275 - 295 mOsm/kg    eGFR-Cr 108 >=60 mL/min/1.73m2    AST 24 <34 U/L    ALT 21 10 - 49 U/L    Alkaline Phosphatase 43 (L) 45 - 117 U/L    Bilirubin, Total 0.6 0.3 - 1.2 mg/dL    Total Protein 7.0 5.7 - 8.2 g/dL    Albumin 4.7 3.2 - 4.8 g/dL    Globulin  2.3 2.0 - 3.5 g/dL    A/G Ratio 2.0 1.0 - 2.0    Patient Fasting for CMP? Patient not present    TSH [E]    Collection Time: 12/05/24  9:02 AM   Result Value Ref Range    TSH 0.822 0.550 - 4.780 uIU/mL   T4 FREE [E]    Collection Time: 12/05/24  9:02 AM   Result Value Ref Range    Free T4 1.2 0.8 - 1.7 ng/dL     Impression and Plan   1.   Malignant melanoma of right upper back: Patient is staged as [id number]. In 06/2024, he began adjuvant systemic therapy with nivolumab.           Proceed with cycle 7 of nivolumab without modification. Continue  regular follow up with dermatology. CT imaging is scheduled.     Planned Follow Up   Patient will return for follow up and treatment in 4 weeks.    Electronically signed by:    Lacho Hodge M.D.  Systemic Medical Director of Oncology Services  [facility]

## 2024-12-05 ENCOUNTER — OFFICE VISIT (OUTPATIENT)
Dept: HEMATOLOGY/ONCOLOGY | Facility: HOSPITAL | Age: 54
End: 2024-12-05
Attending: SPECIALIST
Payer: COMMERCIAL

## 2024-12-05 VITALS
RESPIRATION RATE: 16 BRPM | SYSTOLIC BLOOD PRESSURE: 125 MMHG | BODY MASS INDEX: 27.14 KG/M2 | HEART RATE: 55 BPM | DIASTOLIC BLOOD PRESSURE: 79 MMHG | WEIGHT: 196 LBS | OXYGEN SATURATION: 99 % | HEIGHT: 71.1 IN | TEMPERATURE: 98 F

## 2024-12-05 DIAGNOSIS — Z51.12 ENCOUNTER FOR ANTINEOPLASTIC IMMUNOTHERAPY: ICD-10-CM

## 2024-12-05 DIAGNOSIS — C77.3 SECONDARY MALIGNANT NEOPLASM OF AXILLARY LYMPH NODES (HCC): Primary | ICD-10-CM

## 2024-12-05 DIAGNOSIS — C43.59 MALIGNANT MELANOMA OF UPPER BACK (HCC): ICD-10-CM

## 2024-12-05 DIAGNOSIS — C43.59 MALIGNANT MELANOMA OF UPPER BACK (HCC): Primary | ICD-10-CM

## 2024-12-05 LAB
ALBUMIN SERPL-MCNC: 4.7 G/DL (ref 3.2–4.8)
ALBUMIN/GLOB SERPL: 2 {RATIO} (ref 1–2)
ALP LIVER SERPL-CCNC: 43 U/L
ALT SERPL-CCNC: 21 U/L
ANION GAP SERPL CALC-SCNC: 8 MMOL/L (ref 0–18)
AST SERPL-CCNC: 24 U/L (ref ?–34)
BASOPHILS # BLD AUTO: 0.04 X10(3) UL (ref 0–0.2)
BASOPHILS NFR BLD AUTO: 0.6 %
BILIRUB SERPL-MCNC: 0.6 MG/DL (ref 0.3–1.2)
BUN BLD-MCNC: 20 MG/DL (ref 9–23)
CALCIUM BLD-MCNC: 9.9 MG/DL (ref 8.7–10.4)
CHLORIDE SERPL-SCNC: 104 MMOL/L (ref 98–112)
CO2 SERPL-SCNC: 27 MMOL/L (ref 21–32)
CREAT BLD-MCNC: 0.77 MG/DL
EGFRCR SERPLBLD CKD-EPI 2021: 108 ML/MIN/1.73M2 (ref 60–?)
EOSINOPHIL # BLD AUTO: 0.12 X10(3) UL (ref 0–0.7)
EOSINOPHIL NFR BLD AUTO: 1.7 %
ERYTHROCYTE [DISTWIDTH] IN BLOOD BY AUTOMATED COUNT: 12.3 %
GLOBULIN PLAS-MCNC: 2.3 G/DL (ref 2–3.5)
GLUCOSE BLD-MCNC: 99 MG/DL (ref 70–99)
HCT VFR BLD AUTO: 42.5 %
HGB BLD-MCNC: 14.3 G/DL
IMM GRANULOCYTES # BLD AUTO: 0.02 X10(3) UL (ref 0–1)
IMM GRANULOCYTES NFR BLD: 0.3 %
LYMPHOCYTES # BLD AUTO: 1.65 X10(3) UL (ref 1–4)
LYMPHOCYTES NFR BLD AUTO: 23.2 %
MCH RBC QN AUTO: 31.1 PG (ref 26–34)
MCHC RBC AUTO-ENTMCNC: 33.6 G/DL (ref 31–37)
MCV RBC AUTO: 92.4 FL
MONOCYTES # BLD AUTO: 0.63 X10(3) UL (ref 0.1–1)
MONOCYTES NFR BLD AUTO: 8.8 %
NEUTROPHILS # BLD AUTO: 4.66 X10 (3) UL (ref 1.5–7.7)
NEUTROPHILS # BLD AUTO: 4.66 X10(3) UL (ref 1.5–7.7)
NEUTROPHILS NFR BLD AUTO: 65.4 %
OSMOLALITY SERPL CALC.SUM OF ELEC: 291 MOSM/KG (ref 275–295)
PLATELET # BLD AUTO: 260 10(3)UL (ref 150–450)
POTASSIUM SERPL-SCNC: 4.4 MMOL/L (ref 3.5–5.1)
PROT SERPL-MCNC: 7 G/DL (ref 5.7–8.2)
RBC # BLD AUTO: 4.6 X10(6)UL
SODIUM SERPL-SCNC: 139 MMOL/L (ref 136–145)
T4 FREE SERPL-MCNC: 1.2 NG/DL (ref 0.8–1.7)
TSI SER-ACNC: 0.82 UIU/ML (ref 0.55–4.78)
WBC # BLD AUTO: 7.1 X10(3) UL (ref 4–11)

## 2024-12-05 PROCEDURE — 96413 CHEMO IV INFUSION 1 HR: CPT

## 2024-12-05 PROCEDURE — 99215 OFFICE O/P EST HI 40 MIN: CPT | Performed by: SPECIALIST

## 2024-12-05 NOTE — PROGRESS NOTES
Pt here for C7D1 Drug(s) Opdivo.  Arrives Ambulating independently, accompanied by Spouse     Patient was evaluated today by MD.    Oral medications included in this regimen:  no    Patient confirms comprehension of cancer treatment schedule:  yes    Pregnancy screening:  Not applicable    Modifications in dose or schedule:  No    Medications appearance and physical integrity checked by RN: yes.    Chemotherapy IV pump settings verified by 2 RNs:  No due to targeted therapy IV administration.  Frequency of blood return and site check throughout administration: Prior to administration and At completion of therapy     Infusion/treatment outcome:  patient tolerated treatment without incident    Education Record    Learner:  Patient and Spouse  Barriers / Limitations:  None  Method:  Discussion  Education / instructions given:  plan of care, next appts  Outcome:  Shows understanding    Discharged Home, Ambulating independently, accompanied by:Spouse    Patient/family verbalized understanding of future appointments: by My[employer] messaging

## 2024-12-05 NOTE — PROGRESS NOTES
Pt here for C7D1 Opdivo. Pt presents with no complaints.       Education Record    Learner:  Patient and Spouse    Disease / Diagnosis: malignant melanoma    Barriers / Limitations:  None   Comments:    Method:  Discussion   Comments:    General Topics:  Medication, Side effects and symptom management, and Plan of care reviewed   Comments:    Outcome:  Observed demonstration and Shows understanding   Comments:

## 2024-12-07 ENCOUNTER — HOSPITAL ENCOUNTER (OUTPATIENT)
Dept: CT IMAGING | Facility: HOSPITAL | Age: 54
Discharge: HOME | End: 2024-12-07
Attending: SPECIALIST
Payer: COMMERCIAL

## 2024-12-07 DIAGNOSIS — C43.59 MALIGNANT MELANOMA OF UPPER BACK (HCC): ICD-10-CM

## 2024-12-07 PROCEDURE — 71260 CT THORAX DX C+: CPT | Performed by: SPECIALIST

## 2024-12-07 PROCEDURE — 74177 CT ABD & PELVIS W/CONTRAST: CPT | Performed by: SPECIALIST

## 2024-12-30 NOTE — PROGRESS NOTES
[facility]ematology Oncology Group Progress Note       Patient Name: Mango Hernandez   YOB: 1970  Medical Record Number: [id number]  Attending Physician: Lacho Hodge M.D.      The 21st Century Cures Act makes medical notes like these available to patients in the interest of transparency. Please be advised this is a medical document. Medical documents are intended to carry relevant information, facts as evident, and the clinical opinion of the practitioner. The medical note is intended as peer to peer communication and may appear blunt or direct. It is written in medical language and may contain abbreviations or verbiage that are unfamiliar.      Date of Visit: 1/2/2025       Chief Complaint  Malignant melanoma of superior right back - follow up and treatment.    Oncologic History  Mango Hernandez is a 54 year old male who on 05/14/2024 underwent shave biopsy of a lesion in the right superior upper back which showed nodular melanoma with following characteristics: ulceration, 3.8 mm depth, 5 mitoses/mm2, no microsatellites, no LVI, no neurotropism, no tumor regression.     PET/CT scan showed no evidence of metastatic disease.     On 06/06/2024 he underwent wide local excision and sentinel lymph node biopsy. Pathology showed no residual melanoma at the primary site and 1 sentinel lymph node with metastasis. Patient was staged as [id number].    On 06/20/2024 he began adjuvant therapy with nivolumab.     History of Present Illness  Patient returns for scheduled follow up and treatment. He has no complaints.     Performance Status   Karnofsky 100% - Normal, no complaints.     Past Medical History (historical data, reviewed by physician)   Malignant melanoma (as above); dilated cardiomyopathy; hypertension.     Past Surgical History (historical data, reviewed by physician)   Wide local excision and sentinel lymph node biopsy for melanoma (as above); pacemaker placed and removed.     Family  History (historical data, reviewed by physician)   Paternal grandmother with lung cancer; paternal grandfather with ?bone cancer; paternal aunt with cancer of unknown type; maternal grandmother with \"abdominal\" cancer.     Social History (historical data, reviewed by physician)   Previous tobacco use.     Current Medications   famotidine 20 MG Oral Tab Take 1 tablet (20 mg total) by mouth 2 (two) times daily.      carvedilol 25 MG Oral Tab Take 1 tablet (25 mg total) by mouth 2 (two) times daily with meals.      lisinopril 10 MG Oral Tab Take 1 tablet (10 mg total) by mouth daily.      spironolactone 25 MG Oral Tab Take 1 tablet (25 mg total) by mouth daily.       Allergies   Mr. Hernandez is allergic to simvastatin.     Vital Signs   /77 (BP Location: Left arm, Patient Position: Sitting, Cuff Size: large)   Pulse 74   Temp 97.5 °F (36.4 °C) (Temporal)   Resp 14   Ht 1.806 m (5' 11.1\")   Wt 90.2 kg (198 lb 12.8 oz)   SpO2 95%   BMI 27.65 kg/m²     Physical Examination   Constitutional      Well developed, well nourished. Appears close to chronological age. No apparent distress.   Head                   Normocephalic and atraumatic.  Eyes   Conjunctiva clear; sclera anicteric.  ENMT                 External nose normal; external ears normal.  Neck                   Supple, without masses.  Lymphatic  No cervical, supraclavicular, axillary lymphadenopathy.  Respiratory          Normal effort; no respiratory distress; lungs clear to auscultation bilaterally.  Cardiovascular     Regular rate and rhythm; normal S1S2.  Abdomen            Non-tender; non-distended; no masses; no fluid wave; no hepatosplenomegaly.  Extremities          No lower extremity edema.  Neurologic           Motor and sensory grossly intact.  Psychiatric          Mood and affect appropriate.    Laboratory   Recent Results (from the past week)   CBC W Differential W Platelet    Collection Time: 01/02/25  9:01 AM   Result Value Ref Range     WBC 8.2 4.0 - 11.0 x10(3) uL    RBC 4.34 4.30 - 5.70 x10(6)uL    HGB 13.7 13.0 - 17.5 g/dL    HCT 40.5 39.0 - 53.0 %    .0 150.0 - 450.0 10(3)uL    MCV 93.3 80.0 - 100.0 fL    MCH 31.6 26.0 - 34.0 pg    MCHC 33.8 31.0 - 37.0 g/dL    RDW 12.2 %    Neutrophil Absolute Prelim 6.22 1.50 - 7.70 x10 (3) uL    Neutrophil Absolute 6.22 1.50 - 7.70 x10(3) uL    Lymphocyte Absolute 1.24 1.00 - 4.00 x10(3) uL    Monocyte Absolute 0.54 0.10 - 1.00 x10(3) uL    Eosinophil Absolute 0.11 0.00 - 0.70 x10(3) uL    Basophil Absolute 0.03 0.00 - 0.20 x10(3) uL    Immature Granulocyte Absolute 0.03 0.00 - 1.00 x10(3) uL    Neutrophil % 76.1 %    Lymphocyte % 15.2 %    Monocyte % 6.6 %    Eosinophil % 1.3 %    Basophil % 0.4 %    Immature Granulocyte % 0.4 %   Comp Metabolic Panel (14)    Collection Time: 25  9:01 AM   Result Value Ref Range    Glucose 100 (H) 70 - 99 mg/dL    Sodium 140 136 - 145 mmol/L    Potassium 4.4 3.5 - 5.1 mmol/L    Chloride 106 98 - 112 mmol/L    CO2 26.0 21.0 - 32.0 mmol/L    Anion Gap 8 0 - 18 mmol/L    BUN 21 9 - 23 mg/dL    Creatinine 0.84 0.70 - 1.30 mg/dL    Calcium, Total 9.5 8.7 - 10.4 mg/dL    Calculated Osmolality 293 275 - 295 mOsm/kg    eGFR-Cr 105 >=60 mL/min/1.73m2    AST 18 <34 U/L    ALT 19 10 - 49 U/L    Alkaline Phosphatase 38 (L) 45 - 117 U/L    Bilirubin, Total 0.4 0.3 - 1.2 mg/dL    Total Protein 6.7 5.7 - 8.2 g/dL    Albumin 4.5 3.2 - 4.8 g/dL    Globulin  2.2 2.0 - 3.5 g/dL    A/G Ratio 2.0 1.0 - 2.0    Patient Fasting for CMP? Patient not present    TSH [E]    Collection Time: 25  9:01 AM   Result Value Ref Range    TSH 0.588 0.550 - 4.780 uIU/mL   T4 FREE [E]    Collection Time: 25  9:01 AM   Result Value Ref Range    Free T4 1.3 0.8 - 1.7 ng/dL     Radiology  [facility]  Department of Radiology  [address] [place] [address] [address] [place] [id number] [phone]      Name: Mango Hernandez Dr: Lacho Hodge MD  :  8/21/1970    Age/Sex: 54 year old Male  Pt. Phone: [phone]  Exam Date: 12/07/2024  Procedure: CT CHEST+ABDOMEN+PELVIS(ALL CNTRST ONLY)(LJT=62408/74648)   -----------------------------------------------------------------------------------------------------------------------------------------------                  Lacho Hodge MD  [address] [place] [address] [facility] [id number]      Interpretation   PROCEDURE:  CT CHEST+ABDOMEN+PELVIS(ALL CNTRST ONLY)(CPT=71260/44367)     COMPARISON:  [facility] , CT, CT CHEST+ABDOMEN+PELVIS(ALL CNTRST ONLY)(CPT=71260/16288), 9/10/2024, 7:34 AM.     INDICATIONS:  C43.59 Malignant melanoma of upper back (HCC)     TECHNIQUE:  IV contrast-enhanced scanning through the chest, abdomen, and pelvis was performed.  Dose reduction techniques were used. Dose information is transmitted to the ACR (American College of Radiology) NRDR (National Radiology Data Registry) which   includes the Dose Index Registry.     PATIENT STATED HISTORY:(As transcribed by Technologist)  disease surveillance, right upper back  malignant melanoma       CONTRAST USED:  100cc of Isovue 370     FINDINGS:       CHEST:    LUNGS:  Stable 3 mm nodule in the right upper lobe (series 4, image 55).  No new or enlarging nodule.  No pneumothorax.  The central airways are patent.  MEDIASTINUM:  No mass or adenopathy.    SANTY:  No mass or adenopathy.    CARDIAC:  No enlargement, pericardial thickening, or significant coronary artery calcification.  PLEURA:  No mass or effusion.    CHEST WALL:  There is an approximately 4.4 x 1.6 cm fluid density structure within the right posterior subcutaneous fat extending to the skin surface, grossly stable from prior, likely representing a postoperative seroma there is been interval decrease   in attenuation in comparison to prior.  No axillary lymphadenopathy.  AORTA:  No aneurysm or dissection.    VASCULATURE:  No visible pulmonary arterial thrombus or  attenuation.       ABDOMEN/PELVIS:  LIVER:  No enlargement, atrophy, abnormal density, or significant focal lesion.    BILIARY:  No visible dilatation or calcification.    PANCREAS:  No lesion, fluid collection, ductal dilatation, or atrophy.    SPLEEN:  No enlargement or focal lesion.    KIDNEYS:  No mass, obstruction, or calcification.    ADRENALS:  No mass or enlargement.    AORTA:  No aneurysm or dissection.    RETROPERITONEUM:  No mass or adenopathy.    BOWEL/MESENTERY:  No dilated bowel or wall thickening.  Colonic diverticulosis, without diverticulitis.  The appendix not visualized  ABDOMINAL WALL:  Grossly stable ventral abdominal hernia and bilateral inguinal hernias  URINARY BLADDER:  Circumferential urinary bladder wall thickening  PELVIC NODES:  No adenopathy.    PELVIC ORGANS:  No visible mass.  Pelvic organs appropriate for patient age.    BONES:  Degenerative changes of the spine.                   =====  CONCLUSION:    1. No definite findings to suggest metastatic disease in the chest, abdomen, or pelvis or significant interval change from prior.  2. Indeterminate stable 3 mm nodule in the right upper lobe, attention on follow-up is recommended.  3. Low attenuation collection within the right posterior subcutaneous fat, likely representing a postoperative seroma, unchanged in size in comparison to prior.  A continued attention on follow-up is recommended.  4. Circumferential urinary bladder wall thickening correlate for cystitis.  5. Please see above for further details           LOCATION:  [id number]           Dictated by (CST): Rob Clements MD on 12/07/2024 at 3:43 PM       Finalized by (CST): Rob Clements MD on 12/07/2024 at 3:55 PM        Impression and Plan   1.   Malignant melanoma of right upper back: Patient is staged as [id number]. In 06/2024, he began adjuvant systemic therapy with nivolumab.           Proceed with cycle 8 of nivolumab without modification. Continue regular follow up with  dermatology.     Planned Follow Up   Patient will return for follow up and treatment in 4 weeks.    Electronically Signed by:     Lacho Hodge M.D.  System Medical Director, Oncology Services  [facility]

## 2025-01-02 ENCOUNTER — OFFICE VISIT (OUTPATIENT)
Age: 55
End: 2025-01-02
Attending: SPECIALIST
Payer: COMMERCIAL

## 2025-01-02 VITALS
TEMPERATURE: 98 F | WEIGHT: 198.81 LBS | BODY MASS INDEX: 27.53 KG/M2 | DIASTOLIC BLOOD PRESSURE: 77 MMHG | RESPIRATION RATE: 14 BRPM | SYSTOLIC BLOOD PRESSURE: 120 MMHG | HEIGHT: 71.1 IN | HEART RATE: 74 BPM | OXYGEN SATURATION: 95 %

## 2025-01-02 DIAGNOSIS — C43.59 MALIGNANT MELANOMA OF UPPER BACK (HCC): Primary | ICD-10-CM

## 2025-01-02 DIAGNOSIS — C43.59 MALIGNANT MELANOMA OF UPPER BACK (HCC): ICD-10-CM

## 2025-01-02 DIAGNOSIS — Z51.12 ENCOUNTER FOR ANTINEOPLASTIC IMMUNOTHERAPY: ICD-10-CM

## 2025-01-02 DIAGNOSIS — C77.3 SECONDARY MALIGNANT NEOPLASM OF AXILLARY LYMPH NODES (HCC): Primary | ICD-10-CM

## 2025-01-02 LAB
ALBUMIN SERPL-MCNC: 4.5 G/DL (ref 3.2–4.8)
ALBUMIN/GLOB SERPL: 2 {RATIO} (ref 1–2)
ALP LIVER SERPL-CCNC: 38 U/L
ALT SERPL-CCNC: 19 U/L
ANION GAP SERPL CALC-SCNC: 8 MMOL/L (ref 0–18)
AST SERPL-CCNC: 18 U/L (ref ?–34)
BASOPHILS # BLD AUTO: 0.03 X10(3) UL (ref 0–0.2)
BASOPHILS NFR BLD AUTO: 0.4 %
BILIRUB SERPL-MCNC: 0.4 MG/DL (ref 0.3–1.2)
BUN BLD-MCNC: 21 MG/DL (ref 9–23)
CALCIUM BLD-MCNC: 9.5 MG/DL (ref 8.7–10.4)
CHLORIDE SERPL-SCNC: 106 MMOL/L (ref 98–112)
CO2 SERPL-SCNC: 26 MMOL/L (ref 21–32)
CREAT BLD-MCNC: 0.84 MG/DL
EGFRCR SERPLBLD CKD-EPI 2021: 105 ML/MIN/1.73M2 (ref 60–?)
EOSINOPHIL # BLD AUTO: 0.11 X10(3) UL (ref 0–0.7)
EOSINOPHIL NFR BLD AUTO: 1.3 %
ERYTHROCYTE [DISTWIDTH] IN BLOOD BY AUTOMATED COUNT: 12.2 %
GLOBULIN PLAS-MCNC: 2.2 G/DL (ref 2–3.5)
GLUCOSE BLD-MCNC: 100 MG/DL (ref 70–99)
HCT VFR BLD AUTO: 40.5 %
HGB BLD-MCNC: 13.7 G/DL
IMM GRANULOCYTES # BLD AUTO: 0.03 X10(3) UL (ref 0–1)
IMM GRANULOCYTES NFR BLD: 0.4 %
LYMPHOCYTES # BLD AUTO: 1.24 X10(3) UL (ref 1–4)
LYMPHOCYTES NFR BLD AUTO: 15.2 %
MCH RBC QN AUTO: 31.6 PG (ref 26–34)
MCHC RBC AUTO-ENTMCNC: 33.8 G/DL (ref 31–37)
MCV RBC AUTO: 93.3 FL
MONOCYTES # BLD AUTO: 0.54 X10(3) UL (ref 0.1–1)
MONOCYTES NFR BLD AUTO: 6.6 %
NEUTROPHILS # BLD AUTO: 6.22 X10 (3) UL (ref 1.5–7.7)
NEUTROPHILS # BLD AUTO: 6.22 X10(3) UL (ref 1.5–7.7)
NEUTROPHILS NFR BLD AUTO: 76.1 %
OSMOLALITY SERPL CALC.SUM OF ELEC: 293 MOSM/KG (ref 275–295)
PLATELET # BLD AUTO: 226 10(3)UL (ref 150–450)
POTASSIUM SERPL-SCNC: 4.4 MMOL/L (ref 3.5–5.1)
PROT SERPL-MCNC: 6.7 G/DL (ref 5.7–8.2)
RBC # BLD AUTO: 4.34 X10(6)UL
SODIUM SERPL-SCNC: 140 MMOL/L (ref 136–145)
T4 FREE SERPL-MCNC: 1.3 NG/DL (ref 0.8–1.7)
TSI SER-ACNC: 0.59 UIU/ML (ref 0.55–4.78)
WBC # BLD AUTO: 8.2 X10(3) UL (ref 4–11)

## 2025-01-02 NOTE — PROGRESS NOTES
Pt here for C8D1 Drug(s)Opdivo.  Arrives Ambulating independently, accompanied by Spouse     Patient was evaluated today by MD.    Oral medications included in this regimen:  no    Patient confirms comprehension of cancer treatment schedule:  yes    Pregnancy screening:  Not applicable    Modifications in dose or schedule:  No    Medications appearance and physical integrity checked by RN: yes.    Chemotherapy IV pump settings verified by 2 RNs:  No due to targeted therapy IV administration.  Frequency of blood return and site check throughout administration: Prior to administration     Infusion/treatment outcome:  patient tolerated treatment without incident    Education Record    Learner:  Patient  Barriers / Limitations:  None  Method:  Brief focused  Education / instructions given:  poc  Outcome:  Shows understanding    Discharged Home, Ambulating independently, accompanied by:Spouse    Patient/family verbalized understanding of future appointments: by [employer] messaging    Pt discharged in stable condition. Pt to rtc 1/30 @ 9:15 for next appt

## 2025-01-14 ENCOUNTER — TELEPHONE (OUTPATIENT)
Dept: SURGERY | Facility: CLINIC | Age: 55
End: 2025-01-14

## 2025-01-14 NOTE — TELEPHONE ENCOUNTER
Patient was called and LVM to schedule his US of the axillary. Central scheduling number was provided.

## 2025-01-29 NOTE — PROGRESS NOTES
[facility] Hematology Oncology Group Progress Note       Patient Name: Mango Hernandez   YOB: 1970  Medical Record Number: [id number]  Attending Physician: Lacho Hodge M.D.      The 21st Century Cures Act makes medical notes like these available to patients in the interest of transparency. Please be advised this is a medical document. Medical documents are intended to carry relevant information, facts as evident, and the clinical opinion of the practitioner. The medical note is intended as peer to peer communication and may appear blunt or direct. It is written in medical language and may contain abbreviations or verbiage that are unfamiliar.      Date of Visit: 1/30/2025       Chief Complaint  Malignant melanoma of superior right back - follow up and treatment.    Oncologic History  Mango Hernandez is a 54 year old male who on 05/14/2024 underwent shave biopsy of a lesion in the right superior upper back which showed nodular melanoma with following characteristics: ulceration, 3.8 mm depth, 5 mitoses/mm2, no microsatellites, no LVI, no neurotropism, no tumor regression.     PET/CT scan showed no evidence of metastatic disease.     On 06/06/2024 he underwent wide local excision and sentinel lymph node biopsy. Pathology showed no residual melanoma at the primary site and 1 sentinel lymph node with metastasis. Patient was staged as [id number].    On 06/20/2024 he began adjuvant therapy with nivolumab.     History of Present Illness  Patient returns for scheduled follow up and treatment. He has no complaints.     Performance Status   Karnofsky 100% - Normal, no complaints.     Past Medical History (historical data, reviewed by physician)   Malignant melanoma (as above); dilated cardiomyopathy; hypertension.     Past Surgical History (historical data, reviewed by physician)   Wide local excision and sentinel lymph node biopsy for melanoma (as above); pacemaker placed and removed.     Family  History (historical data, reviewed by physician)   Paternal grandmother with lung cancer; paternal grandfather with ?bone cancer; paternal aunt with cancer of unknown type; maternal grandmother with \"abdominal\" cancer.     Social History (historical data, reviewed by physician)   Previous tobacco use.     Current Medications   famotidine 20 MG Oral Tab Take 1 tablet (20 mg total) by mouth 2 (two) times daily.      carvedilol 25 MG Oral Tab Take 1 tablet (25 mg total) by mouth 2 (two) times daily with meals.      lisinopril 10 MG Oral Tab Take 1 tablet (10 mg total) by mouth daily.      spironolactone 25 MG Oral Tab Take 1 tablet (25 mg total) by mouth daily.       Allergies   Mr. Hernandez is allergic to simvastatin.     Vital Signs   /75 (BP Location: Left arm, Patient Position: Sitting, Cuff Size: large)   Pulse 67   Temp 98.1 °F (36.7 °C) (Temporal)   Resp 16   Ht 1.806 m (5' 11.1\")   Wt 88.2 kg (194 lb 6.4 oz)   SpO2 97%   BMI 27.04 kg/m²     Physical Examination   Constitutional      Well developed, well nourished. No apparent distress.   Head                   Normocephalic and atraumatic.  Eyes   Conjunctiva clear; sclera anicteric.  ENMT                 External nose normal; external ears normal.  Neck                   Supple, without masses.  Lymphatic  No cervical, supraclavicular, axillary lymphadenopathy.  Respiratory          Normal effort; no respiratory distress; lungs clear to auscultation bilaterally.  Cardiovascular     Regular rate and rhythm; normal S1S2.  Abdomen            Non-tender; non-distended; no masses; no fluid wave; no hepatosplenomegaly.  Extremities          No lower extremity edema.  Neurologic           Motor and sensory grossly intact.  Psychiatric          Mood and affect appropriate.    Laboratory   Recent Results (from the past week)   CBC W Differential W Platelet    Collection Time: 01/30/25  9:07 AM   Result Value Ref Range    WBC 6.4 4.0 - 11.0 x10(3) uL    RBC  4.46 4.30 - 5.70 x10(6)uL    HGB 13.8 13.0 - 17.5 g/dL    HCT 40.8 39.0 - 53.0 %    .0 150.0 - 450.0 10(3)uL    MCV 91.5 80.0 - 100.0 fL    MCH 30.9 26.0 - 34.0 pg    MCHC 33.8 31.0 - 37.0 g/dL    RDW 11.9 %    Neutrophil Absolute Prelim 4.19 1.50 - 7.70 x10 (3) uL    Neutrophil Absolute 4.19 1.50 - 7.70 x10(3) uL    Lymphocyte Absolute 1.66 1.00 - 4.00 x10(3) uL    Monocyte Absolute 0.40 0.10 - 1.00 x10(3) uL    Eosinophil Absolute 0.09 0.00 - 0.70 x10(3) uL    Basophil Absolute 0.03 0.00 - 0.20 x10(3) uL    Immature Granulocyte Absolute 0.01 0.00 - 1.00 x10(3) uL    Neutrophil % 65.6 %    Lymphocyte % 26.0 %    Monocyte % 6.3 %    Eosinophil % 1.4 %    Basophil % 0.5 %    Immature Granulocyte % 0.2 %   Comp Metabolic Panel (14)    Collection Time: 01/30/25  9:07 AM   Result Value Ref Range    Glucose 97 70 - 99 mg/dL    Sodium 142 136 - 145 mmol/L    Potassium 4.2 3.5 - 5.1 mmol/L    Chloride 105 98 - 112 mmol/L    CO2 27.0 21.0 - 32.0 mmol/L    Anion Gap 10 0 - 18 mmol/L    BUN 18 9 - 23 mg/dL    Creatinine 0.84 0.70 - 1.30 mg/dL    Calcium, Total 9.4 8.7 - 10.6 mg/dL    Calculated Osmolality 296 (H) 275 - 295 mOsm/kg    eGFR-Cr 105 >=60 mL/min/1.73m2    AST 23 <34 U/L    ALT 20 10 - 49 U/L    Alkaline Phosphatase 41 (L) 45 - 117 U/L    Bilirubin, Total 0.4 0.3 - 1.2 mg/dL    Total Protein 6.7 5.7 - 8.2 g/dL    Albumin 4.6 3.2 - 4.8 g/dL    Globulin  2.1 2.0 - 3.5 g/dL    A/G Ratio 2.2 (H) 1.0 - 2.0    Patient Fasting for CMP? Patient not present    TSH [E]    Collection Time: 01/30/25  9:07 AM   Result Value Ref Range    TSH 0.593 0.550 - 4.780 uIU/mL   T4 FREE [E]    Collection Time: 01/30/25  9:07 AM   Result Value Ref Range    Free T4 1.1 0.8 - 1.7 ng/dL     Impression and Plan   1.   Malignant melanoma of right upper back: Patient is staged as [id number]. In 06/2024, he began adjuvant systemic therapy with nivolumab.           Proceed with cycle 9 of nivolumab without modification. Continue regular  follow up with dermatology.     Planned Follow Up   Patient will return for follow up and treatment in 4 weeks.    Electronically Signed by:     Lacho Hodge M.D.  System Medical Director, Oncology Services  [place] [facility]

## 2025-01-30 ENCOUNTER — OFFICE VISIT (OUTPATIENT)
Age: 55
End: 2025-01-30
Attending: SPECIALIST
Payer: COMMERCIAL

## 2025-01-30 VITALS
RESPIRATION RATE: 16 BRPM | WEIGHT: 194.38 LBS | HEIGHT: 71.1 IN | OXYGEN SATURATION: 97 % | DIASTOLIC BLOOD PRESSURE: 75 MMHG | HEART RATE: 67 BPM | SYSTOLIC BLOOD PRESSURE: 127 MMHG | BODY MASS INDEX: 26.91 KG/M2 | TEMPERATURE: 98 F

## 2025-01-30 DIAGNOSIS — Z51.12 ENCOUNTER FOR ANTINEOPLASTIC IMMUNOTHERAPY: ICD-10-CM

## 2025-01-30 DIAGNOSIS — C43.59 MALIGNANT MELANOMA OF UPPER BACK (HCC): Primary | ICD-10-CM

## 2025-01-30 DIAGNOSIS — C77.3 SECONDARY MALIGNANT NEOPLASM OF AXILLARY LYMPH NODES (HCC): Primary | ICD-10-CM

## 2025-01-30 DIAGNOSIS — C43.59 MALIGNANT MELANOMA OF UPPER BACK (HCC): ICD-10-CM

## 2025-01-30 LAB
ALBUMIN SERPL-MCNC: 4.6 G/DL (ref 3.2–4.8)
ALBUMIN/GLOB SERPL: 2.2 {RATIO} (ref 1–2)
ALP LIVER SERPL-CCNC: 41 U/L
ALT SERPL-CCNC: 20 U/L
ANION GAP SERPL CALC-SCNC: 10 MMOL/L (ref 0–18)
AST SERPL-CCNC: 23 U/L (ref ?–34)
BASOPHILS # BLD AUTO: 0.03 X10(3) UL (ref 0–0.2)
BASOPHILS NFR BLD AUTO: 0.5 %
BILIRUB SERPL-MCNC: 0.4 MG/DL (ref 0.3–1.2)
BUN BLD-MCNC: 18 MG/DL (ref 9–23)
CALCIUM BLD-MCNC: 9.4 MG/DL (ref 8.7–10.6)
CHLORIDE SERPL-SCNC: 105 MMOL/L (ref 98–112)
CO2 SERPL-SCNC: 27 MMOL/L (ref 21–32)
CREAT BLD-MCNC: 0.84 MG/DL
EGFRCR SERPLBLD CKD-EPI 2021: 105 ML/MIN/1.73M2 (ref 60–?)
EOSINOPHIL # BLD AUTO: 0.09 X10(3) UL (ref 0–0.7)
EOSINOPHIL NFR BLD AUTO: 1.4 %
ERYTHROCYTE [DISTWIDTH] IN BLOOD BY AUTOMATED COUNT: 11.9 %
GLOBULIN PLAS-MCNC: 2.1 G/DL (ref 2–3.5)
GLUCOSE BLD-MCNC: 97 MG/DL (ref 70–99)
HCT VFR BLD AUTO: 40.8 %
HGB BLD-MCNC: 13.8 G/DL
IMM GRANULOCYTES # BLD AUTO: 0.01 X10(3) UL (ref 0–1)
IMM GRANULOCYTES NFR BLD: 0.2 %
LYMPHOCYTES # BLD AUTO: 1.66 X10(3) UL (ref 1–4)
LYMPHOCYTES NFR BLD AUTO: 26 %
MCH RBC QN AUTO: 30.9 PG (ref 26–34)
MCHC RBC AUTO-ENTMCNC: 33.8 G/DL (ref 31–37)
MCV RBC AUTO: 91.5 FL
MONOCYTES # BLD AUTO: 0.4 X10(3) UL (ref 0.1–1)
MONOCYTES NFR BLD AUTO: 6.3 %
NEUTROPHILS # BLD AUTO: 4.19 X10 (3) UL (ref 1.5–7.7)
NEUTROPHILS # BLD AUTO: 4.19 X10(3) UL (ref 1.5–7.7)
NEUTROPHILS NFR BLD AUTO: 65.6 %
OSMOLALITY SERPL CALC.SUM OF ELEC: 296 MOSM/KG (ref 275–295)
PLATELET # BLD AUTO: 243 10(3)UL (ref 150–450)
POTASSIUM SERPL-SCNC: 4.2 MMOL/L (ref 3.5–5.1)
PROT SERPL-MCNC: 6.7 G/DL (ref 5.7–8.2)
RBC # BLD AUTO: 4.46 X10(6)UL
SODIUM SERPL-SCNC: 142 MMOL/L (ref 136–145)
T4 FREE SERPL-MCNC: 1.1 NG/DL (ref 0.8–1.7)
TSI SER-ACNC: 0.59 UIU/ML (ref 0.55–4.78)
WBC # BLD AUTO: 6.4 X10(3) UL (ref 4–11)

## 2025-01-30 NOTE — PROGRESS NOTES
Pt here for C9D1 Drug(s)opdivo.  Arrives Ambulating independently, accompanied by Spouse     Patient was evaluated today by MD.    Oral medications included in this regimen:  no    Patient confirms comprehension of cancer treatment schedule:  yes    Pregnancy screening:  Not applicable    Modifications in dose or schedule:  No    Medications appearance and physical integrity checked by RN: yes.    Chemotherapy IV pump settings verified by 2 RNs:  No due to targeted therapy IV administration.  Frequency of blood return and site check throughout administration: Prior to administration     Infusion/treatment outcome:  patient tolerated treatment without incident    Education Record    Learner:  Patient  Barriers / Limitations:  None  Method:  Brief focused  Education / instructions given:  poc  Outcome:  Shows understanding    Discharged Home, Ambulating independently, accompanied by:Spouse    Patient/family verbalized understanding of future appointments: by [employer] messaging    Pt discharged in stable condition.

## 2025-01-30 NOTE — PROGRESS NOTES
Outpatient Oncology Care Plan  Problem list:  nausea and vomiting    Problems related to:    disease/disease progression    Interventions:  provided general teaching    Expected outcomes:  understands plan of care    Progress towards outcome:  making progress    Education Record    Learner:  Patient  Barriers / Limitations:  None  Method:  Brief focused and Reinforcement  Outcome:  Shows understanding  Comments:    Patient here for follow up and C9D1 Opdivo. Feels well overall. He c/o occasional nausea when he's hungry. Denies new rashes/skin lesions.

## 2025-02-04 ENCOUNTER — TELEPHONE (OUTPATIENT)
Dept: SURGERY | Facility: CLINIC | Age: 55
End: 2025-02-04

## 2025-02-04 NOTE — TELEPHONE ENCOUNTER
Called patient and reminded him to schedule US axilla. He expressed understanding and agreed.     FÉLIX Montes

## 2025-02-21 ENCOUNTER — TELEPHONE (OUTPATIENT)
Dept: SURGERY | Facility: CLINIC | Age: 55
End: 2025-02-21

## 2025-02-21 ENCOUNTER — HOSPITAL ENCOUNTER (OUTPATIENT)
Dept: ULTRASOUND IMAGING | Age: 55
Discharge: HOME | End: 2025-02-21
Payer: COMMERCIAL

## 2025-02-21 DIAGNOSIS — C43.59 MALIGNANT MELANOMA OF UPPER BACK (HCC): ICD-10-CM

## 2025-02-21 DIAGNOSIS — C77.3 SECONDARY MALIGNANT NEOPLASM OF AXILLARY LYMPH NODES (HCC): ICD-10-CM

## 2025-02-21 PROCEDURE — 76882 US LMTD JT/FCL EVL NVASC XTR: CPT

## 2025-02-26 ENCOUNTER — TELEPHONE (OUTPATIENT)
Age: 55
End: 2025-02-26

## 2025-02-26 NOTE — TELEPHONE ENCOUNTER
Mango calling he tested positive for covid yesterday and scheduled for immunotherapy 2/27. Please call him back. Thank you [id number]

## 2025-02-27 ENCOUNTER — APPOINTMENT (OUTPATIENT)
Age: 55
End: 2025-02-27
Attending: SPECIALIST
Payer: COMMERCIAL

## 2025-03-05 NOTE — PROGRESS NOTES
[facility] Progress Note       Patient Name: Mango Hernandez   YOB: 1970  Medical Record Number: [id number]  Attending Physician: Lacho Hodge M.D.      The 21st Century Cures Act makes medical notes like these available to patients in the interest of transparency. Please be advised this is a medical document. Medical documents are intended to carry relevant information, facts as evident, and the clinical opinion of the practitioner. The medical note is intended as peer to peer communication and may appear blunt or direct. It is written in medical language and may contain abbreviations or verbiage that are unfamiliar.      Date of Visit: 3/6/2025       Chief Complaint  Malignant melanoma of superior right back - follow up and treatment.    Oncologic History  Mango Hernandez is a 54 year old male who on 05/14/2024 underwent shave biopsy of a lesion in the right superior upper back which showed nodular melanoma with following characteristics: ulceration, 3.8 mm depth, 5 mitoses/mm2, no microsatellites, no LVI, no neurotropism, no tumor regression.     PET/CT scan showed no evidence of metastatic disease.     On 06/06/2024 he underwent wide local excision and sentinel lymph node biopsy. Pathology showed no residual melanoma at the primary site and 1 sentinel lymph node with metastasis. Patient was staged as [id number].    On 06/20/2024 he began adjuvant therapy with nivolumab.     History of Present Illness  Patient returns for scheduled follow up and treatment. He has no complaints. He returns 1 week later than originally scheduled because he was diagnosed with COVID last week.     Performance Status   Karnofsky 100% - Normal, no complaints.     Past Medical History (historical data, reviewed by physician)   Malignant melanoma (as above); dilated cardiomyopathy; hypertension.     Past Surgical History (historical data, reviewed by physician)   Wide local excision and  sentinel lymph node biopsy for melanoma (as above); pacemaker placed and removed.     Family History (historical data, reviewed by physician)   Paternal grandmother with lung cancer; paternal grandfather with ?bone cancer; paternal aunt with cancer of unknown type; maternal grandmother with \"abdominal\" cancer.     Social History (historical data, reviewed by physician)   Previous tobacco use.     Current Medications   famotidine 20 MG Oral Tab Take 1 tablet (20 mg total) by mouth 2 (two) times daily.      carvedilol 25 MG Oral Tab Take 1 tablet (25 mg total) by mouth 2 (two) times daily with meals.      lisinopril 10 MG Oral Tab Take 1 tablet (10 mg total) by mouth daily.      spironolactone 25 MG Oral Tab Take 1 tablet (25 mg total) by mouth daily.       Allergies   Mr. Hernandez is allergic to simvastatin.     Vital Signs   /75 (BP Location: Left arm, Patient Position: Sitting, Cuff Size: large)   Pulse 59   Temp 97.8 °F (36.6 °C) (Temporal)   Resp 16   Ht 1.806 m (5' 11.1\")   Wt 87.5 kg (193 lb)   SpO2 99%   BMI 26.84 kg/m²     Physical Examination   Constitutional      Well developed, well nourished. No apparent distress.   Head                   Normocephalic and atraumatic.  Eyes   Conjunctiva clear; sclera anicteric.  ENMT                 External nose normal; external ears normal.  Neck                   Supple, without masses.  Lymphatic  No cervical, supraclavicular, axillary lymphadenopathy.  Respiratory          Normal effort; no respiratory distress; lungs clear to auscultation bilaterally.  Cardiovascular     Regular rate and rhythm; normal S1S2.  Abdomen            Non-tender; non-distended; no masses; no fluid wave; no hepatosplenomegaly.  Extremities          No lower extremity edema.  Neurologic           Motor and sensory grossly intact.  Psychiatric          Mood and affect appropriate.    Laboratory   Recent Results (from the past week)   CBC W Differential W Platelet    Collection  Time: 03/06/25  8:52 AM   Result Value Ref Range    WBC 6.7 4.0 - 11.0 x10(3) uL    RBC 4.31 4.30 - 5.70 x10(6)uL    HGB 13.5 13.0 - 17.5 g/dL    HCT 39.6 39.0 - 53.0 %    .0 150.0 - 450.0 10(3)uL    MCV 91.9 80.0 - 100.0 fL    MCH 31.3 26.0 - 34.0 pg    MCHC 34.1 31.0 - 37.0 g/dL    RDW 11.9 %    Neutrophil Absolute Prelim 4.74 1.50 - 7.70 x10 (3) uL    Neutrophil Absolute 4.74 1.50 - 7.70 x10(3) uL    Lymphocyte Absolute 1.45 1.00 - 4.00 x10(3) uL    Monocyte Absolute 0.40 0.10 - 1.00 x10(3) uL    Eosinophil Absolute 0.06 0.00 - 0.70 x10(3) uL    Basophil Absolute 0.02 0.00 - 0.20 x10(3) uL    Immature Granulocyte Absolute 0.02 0.00 - 1.00 x10(3) uL    Neutrophil % 70.8 %    Lymphocyte % 21.7 %    Monocyte % 6.0 %    Eosinophil % 0.9 %    Basophil % 0.3 %    Immature Granulocyte % 0.3 %   Comp Metabolic Panel (14)    Collection Time: 03/06/25  8:52 AM   Result Value Ref Range    Glucose 88 70 - 99 mg/dL    Sodium 141 136 - 145 mmol/L    Potassium 4.0 3.5 - 5.1 mmol/L    Chloride 104 98 - 112 mmol/L    CO2 29.0 21.0 - 32.0 mmol/L    Anion Gap 8 0 - 18 mmol/L    BUN 16 9 - 23 mg/dL    Creatinine 0.87 0.70 - 1.30 mg/dL    Calcium, Total 9.6 8.7 - 10.6 mg/dL    Calculated Osmolality 293 275 - 295 mOsm/kg    eGFR-Cr 104 >=60 mL/min/1.73m2    AST 22 <34 U/L    ALT 20 10 - 49 U/L    Alkaline Phosphatase 39 (L) 45 - 117 U/L    Bilirubin, Total 0.5 0.3 - 1.2 mg/dL    Total Protein 6.9 5.7 - 8.2 g/dL    Albumin 4.8 3.2 - 4.8 g/dL    Globulin  2.1 2.0 - 3.5 g/dL    A/G Ratio 2.3 (H) 1.0 - 2.0    Patient Fasting for CMP? Patient not present      Impression and Plan   1.   Malignant melanoma of right upper back: Patient is staged as [id number]. In 06/2024, he began adjuvant systemic therapy with nivolumab.           Proceed with cycle 10 of nivolumab without modification. Continue regular follow up with dermatology. CT imaging ordered.     Planned Follow Up   Patient will return for follow up and treatment in 4  weeks.    Electronically Signed by:     Lacho Hodge M.D.  System Medical Director, Oncology Services  [facility]

## 2025-03-06 ENCOUNTER — OFFICE VISIT (OUTPATIENT)
Age: 55
End: 2025-03-06
Attending: SPECIALIST
Payer: COMMERCIAL

## 2025-03-06 VITALS
WEIGHT: 193 LBS | SYSTOLIC BLOOD PRESSURE: 127 MMHG | HEART RATE: 59 BPM | OXYGEN SATURATION: 99 % | HEIGHT: 71.1 IN | TEMPERATURE: 98 F | BODY MASS INDEX: 26.72 KG/M2 | RESPIRATION RATE: 16 BRPM | DIASTOLIC BLOOD PRESSURE: 75 MMHG

## 2025-03-06 DIAGNOSIS — Z51.12 ENCOUNTER FOR ANTINEOPLASTIC IMMUNOTHERAPY: ICD-10-CM

## 2025-03-06 DIAGNOSIS — C43.59 MALIGNANT MELANOMA OF UPPER BACK (HCC): ICD-10-CM

## 2025-03-06 DIAGNOSIS — C77.3 SECONDARY MALIGNANT NEOPLASM OF AXILLARY LYMPH NODES (HCC): Primary | ICD-10-CM

## 2025-03-06 DIAGNOSIS — C43.59 MALIGNANT MELANOMA OF UPPER BACK (HCC): Primary | ICD-10-CM

## 2025-03-06 LAB
ALBUMIN SERPL-MCNC: 4.8 G/DL (ref 3.2–4.8)
ALBUMIN/GLOB SERPL: 2.3 {RATIO} (ref 1–2)
ALP LIVER SERPL-CCNC: 39 U/L
ALT SERPL-CCNC: 20 U/L
ANION GAP SERPL CALC-SCNC: 8 MMOL/L (ref 0–18)
AST SERPL-CCNC: 22 U/L (ref ?–34)
BASOPHILS # BLD AUTO: 0.02 X10(3) UL (ref 0–0.2)
BASOPHILS NFR BLD AUTO: 0.3 %
BILIRUB SERPL-MCNC: 0.5 MG/DL (ref 0.3–1.2)
BUN BLD-MCNC: 16 MG/DL (ref 9–23)
CALCIUM BLD-MCNC: 9.6 MG/DL (ref 8.7–10.6)
CHLORIDE SERPL-SCNC: 104 MMOL/L (ref 98–112)
CO2 SERPL-SCNC: 29 MMOL/L (ref 21–32)
CREAT BLD-MCNC: 0.87 MG/DL
EGFRCR SERPLBLD CKD-EPI 2021: 104 ML/MIN/1.73M2 (ref 60–?)
EOSINOPHIL # BLD AUTO: 0.06 X10(3) UL (ref 0–0.7)
EOSINOPHIL NFR BLD AUTO: 0.9 %
ERYTHROCYTE [DISTWIDTH] IN BLOOD BY AUTOMATED COUNT: 11.9 %
GLOBULIN PLAS-MCNC: 2.1 G/DL (ref 2–3.5)
GLUCOSE BLD-MCNC: 88 MG/DL (ref 70–99)
HCT VFR BLD AUTO: 39.6 %
HGB BLD-MCNC: 13.5 G/DL
IMM GRANULOCYTES # BLD AUTO: 0.02 X10(3) UL (ref 0–1)
IMM GRANULOCYTES NFR BLD: 0.3 %
LYMPHOCYTES # BLD AUTO: 1.45 X10(3) UL (ref 1–4)
LYMPHOCYTES NFR BLD AUTO: 21.7 %
MCH RBC QN AUTO: 31.3 PG (ref 26–34)
MCHC RBC AUTO-ENTMCNC: 34.1 G/DL (ref 31–37)
MCV RBC AUTO: 91.9 FL
MONOCYTES # BLD AUTO: 0.4 X10(3) UL (ref 0.1–1)
MONOCYTES NFR BLD AUTO: 6 %
NEUTROPHILS # BLD AUTO: 4.74 X10 (3) UL (ref 1.5–7.7)
NEUTROPHILS # BLD AUTO: 4.74 X10(3) UL (ref 1.5–7.7)
NEUTROPHILS NFR BLD AUTO: 70.8 %
OSMOLALITY SERPL CALC.SUM OF ELEC: 293 MOSM/KG (ref 275–295)
PLATELET # BLD AUTO: 243 10(3)UL (ref 150–450)
POTASSIUM SERPL-SCNC: 4 MMOL/L (ref 3.5–5.1)
PROT SERPL-MCNC: 6.9 G/DL (ref 5.7–8.2)
RBC # BLD AUTO: 4.31 X10(6)UL
SODIUM SERPL-SCNC: 141 MMOL/L (ref 136–145)
WBC # BLD AUTO: 6.7 X10(3) UL (ref 4–11)

## 2025-03-06 NOTE — PROGRESS NOTES
Pt here for C10D1 Drug(s)opdivo.  Arrives Ambulating independently, accompanied by Self and Spouse     Patient was evaluated today by MD.    Oral medications included in this regimen:  no    Patient confirms comprehension of cancer treatment schedule:  yes    Pregnancy screening:  Not applicable    Modifications in dose or schedule:  No    Medications appearance and physical integrity checked by RN: yes.    Chemotherapy IV pump settings verified by 2 RNs:  No due to targeted therapy IV administration.  Frequency of blood return and site check throughout administration: Prior to administration     Infusion/treatment outcome:  patient tolerated treatment without incident    Education Record    Learner:  Patient  Barriers / Limitations:  None  Method:  Discussion  Education / instructions given:  avs  Outcome:  Shows understanding    Discharged Home, Ambulating independently, accompanied by:Self and Spouse    Patient/family verbalized understanding of future appointments: by [email or web address] messaging    Here for opdivo, labs, and MD. No complaints. Back in four weeks for next cycle.

## 2025-03-06 NOTE — PROGRESS NOTES
Education Record    Learner:  Patient and Spouse    Disease / Diagnosis: Melanoma     Barriers / Limitations:  None   Comments:    Method:  Discussion   Comments:    General Topics:  Medication and Side effects and symptom management   Comments:    Outcome:  Shows understanding   Comments:    Here for C10D1 Nivolumab. He had COVID last week but it was mild and just cold symptoms. He is feeling great now. He has no side effects from treatment.

## 2025-03-10 ENCOUNTER — OFFICE VISIT (OUTPATIENT)
Dept: SURGERY | Facility: CLINIC | Age: 55
End: 2025-03-10
Payer: COMMERCIAL

## 2025-03-10 VITALS
HEART RATE: 57 BPM | DIASTOLIC BLOOD PRESSURE: 82 MMHG | RESPIRATION RATE: 16 BRPM | SYSTOLIC BLOOD PRESSURE: 125 MMHG | TEMPERATURE: 98 F | WEIGHT: 197 LBS | OXYGEN SATURATION: 98 % | BODY MASS INDEX: 27 KG/M2

## 2025-03-10 DIAGNOSIS — C43.59 MALIGNANT MELANOMA OF UPPER BACK (HCC): Primary | ICD-10-CM

## 2025-03-10 DIAGNOSIS — C77.9 MALIGNANT MELANOMA METASTATIC TO LYMPH NODE (HCC): ICD-10-CM

## 2025-03-10 DIAGNOSIS — C77.3 SECONDARY MALIGNANT NEOPLASM OF AXILLARY LYMPH NODES (HCC): ICD-10-CM

## 2025-03-10 PROCEDURE — 99213 OFFICE O/P EST LOW 20 MIN: CPT | Performed by: SURGERY

## 2025-03-10 NOTE — PROGRESS NOTES
[facility] Surgical Oncology      Patient Name:  Mango Hernandze   YOB: 1970   Gender:  Male   Appt Date:  3/10/2025   Provider:  Ian Flor MD     PATIENT PROVIDERS  Referring Provider: FÉLIX Ventura    Primary Care Provider:Liyah Han MD   Address: [address] [place] [address]   Phone #: [phone]       CHIEF COMPLAINT  Chief Complaint   Patient presents with    Follow - Up     Malignant melanoma of upper back        PROBLEMS  Reviewed   Patient Active Problem List   Diagnosis    Acute bilateral low back pain with bilateral sciatica    Lumbar muscle pain    Dilated cardiomyopathy (HCC)    Essential hypertension, benign    Left knee pain    Vitamin D deficiency    Malignant melanoma of upper back (HCC)    Hematoma    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 He states he has had a mole on his upper back since childhood. His wife noticed his mole was growing for the past 2-3 months. Patient denies itching, bleeding or tenderness. Denies weight loss. Patient does mention some \"bumps\" on the right armpit, states the size of these \"bumps\" fluctuates through out the day. Patient does enjoy outdoor activities. Denies history of sunburns. States he applies sunscreen with activities.      Shave biopsy --> Right Superior Lateral Upper Back: Malignant Melanoma (3.8mm)      6/6/2024: wide excision with sentinel lymph node biopsy --> 3.8 mm ulcerated, 1/1 lymph node positive for melanoma     Receiving adjuvant Nivo with Dr. Hodge. He is tolerating well and due to complete end of May.     Last saw dermatologist a few months ago, no recent skin biopsies.      Vital Signs:  /82 (BP Location: Right arm, Patient Position: Sitting, Cuff Size: adult)   Pulse 57   Temp 98.3 °F (36.8 °C)  (Tympanic)   Resp 16   Wt 89.4 kg (197 lb)   SpO2 98%   BMI 27.40 kg/m²      Medications Reviewed:    Current Outpatient Medications:     famotidine 20 MG Oral Tab, Take 1 tablet (20 mg total) by mouth 2 (two) times daily., Disp: , Rfl:     carvedilol 25 MG Oral Tab, Take 1 tablet (25 mg total) by mouth 2 (two) times daily with meals., Disp: , Rfl:     lisinopril 10 MG Oral Tab, Take 1 tablet (10 mg total) by mouth daily., Disp: , Rfl:     spironolactone 25 MG Oral Tab, Take 1 tablet (25 mg total) by mouth daily., Disp: , Rfl:      Allergies Reviewed:  Allergies[1]     History:  Reviewed:  Past Medical History:    Cardiomyopathy (HCC)    Congestive heart disease (HCC)    Dilated cardiomyopathy (HCC)    hx of pacemaker    High blood pressure    Hypertension    Knee pain    Lumbar pain    Visual impairment    Vitamin D deficiency      Reviewed:  Past Surgical History:   Procedure Laterality Date    Colonoscopy  2022    Other surgical history Right     Wide excision with sentinel node (R upper back)    Pacemaker clinic, cardio (internal)      2005 and 2014. Removed 3/2023    Skin biopsy        Reviewed Social History:  Social History     Socioeconomic History    Marital status:    Tobacco Use    Smoking status: Former     Types: Cigarettes    Smokeless tobacco: Never    Tobacco comments:     Quit 2005   Vaping Use    Vaping status: Former   Substance and Sexual Activity    Alcohol use: Not Currently     Comment: occasionally    Drug use: Yes     Types: Cannabis     Comment: 5-7 to sleep      Reviewed:  Family History   Problem Relation Age of Onset    Cancer Maternal Grandmother     Heart Disorder Maternal Grandfather     Cancer Paternal Grandmother     Cancer Paternal Grandfather     Cancer Other       Review of Systems:  Patient reports no rapid or irregular heartbeat. He reports no chest pain. He reports no nausea. He reports no vomiting. He reports no diarrhea. He reports no constipation. He reports no  bright red blood per rectum. He reports no fatigue. He reports no blood in the urine, no changes in urinary habits: initiating urination requires straining, no changes in urinary habits: delays in starting hesitancy, and no change in urine appearance. He reports no headache. He reports no dizziness. He reports no easy bruising tendency. He reports no diffuse joint pains. He reports no bone pain. He reports no back pain. He reports no swollen glands. He reports no numbness. He reports no shortness of breath. He reports no change in a mole. He reports  No recent change in weight, no fever, no chills, and no sweating heavily at night        Physical Examination:  Constitutional: General Appearance: healthy-appearing, well-nourished, and well-developed. Level of Distress: NAD.   Eyes: Sclera: non-icteric.   Neck: Neck: supple.   Lymph Nodes: Lymph Nodes no cervical LAD, supraclavicular LAD, axillary LAD, or inguinal LAD.   Abdomen: Inspection and Palpation: no masses, tenderness (no guarding, no rebound), or CVA tenderness and soft and non-distended. Liver: no hepatomegaly. Spleen: no splenomegaly. Hernia: none palpable.   Musculoskeletal: Extremities: no edema.   Skin: The scalp an remainder of the skin do not show any evidence for new or suspicious lesions. The wide excision site is well healed. There is no evidence for local recurrence. There are no intransit metastases.      Document Review:  US Right Axilla 2/21/2025  FINDINGS:    Right axillary lymph nodes have normal sonographic architecture with fatty hilum and thin cortex, measuring 13 x 6 x 9 mm, 15 x 9 x 3 mm and 11 x 10 x 9 mm.      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  - Continue immunotherapy and imaging per Dr. Hodge.  - Continue monthly self skin examination.  - Return to clinic 4 months with ultrasound right axilla.             Electronically Signed by: Ian Flor MD           [1]   Allergies  Allergen Reactions    Simvastatin OTHER (SEE COMMENTS)     Statin myopathy

## 2025-04-03 ENCOUNTER — OFFICE VISIT (OUTPATIENT)
Age: 55
End: 2025-04-03
Attending: SPECIALIST
Payer: COMMERCIAL

## 2025-04-03 VITALS
RESPIRATION RATE: 16 BRPM | TEMPERATURE: 97 F | HEIGHT: 71.1 IN | WEIGHT: 192 LBS | DIASTOLIC BLOOD PRESSURE: 81 MMHG | HEART RATE: 52 BPM | SYSTOLIC BLOOD PRESSURE: 125 MMHG | BODY MASS INDEX: 26.58 KG/M2 | OXYGEN SATURATION: 99 %

## 2025-04-03 DIAGNOSIS — C43.59 MALIGNANT MELANOMA OF UPPER BACK (HCC): Primary | ICD-10-CM

## 2025-04-03 DIAGNOSIS — C77.3 SECONDARY MALIGNANT NEOPLASM OF AXILLARY LYMPH NODES (HCC): ICD-10-CM

## 2025-04-03 LAB
ALBUMIN SERPL-MCNC: 5.2 G/DL (ref 3.2–4.8)
ALBUMIN/GLOB SERPL: 2.2 {RATIO} (ref 1–2)
ALP LIVER SERPL-CCNC: 44 U/L
ALT SERPL-CCNC: 19 U/L
ANION GAP SERPL CALC-SCNC: 9 MMOL/L (ref 0–18)
AST SERPL-CCNC: 24 U/L (ref ?–34)
BASOPHILS # BLD AUTO: 0.02 X10(3) UL (ref 0–0.2)
BASOPHILS NFR BLD AUTO: 0.3 %
BILIRUB SERPL-MCNC: 0.7 MG/DL (ref 0.3–1.2)
BUN BLD-MCNC: 18 MG/DL (ref 9–23)
CALCIUM BLD-MCNC: 10.3 MG/DL (ref 8.7–10.6)
CHLORIDE SERPL-SCNC: 105 MMOL/L (ref 98–112)
CO2 SERPL-SCNC: 28 MMOL/L (ref 21–32)
CREAT BLD-MCNC: 0.92 MG/DL
EGFRCR SERPLBLD CKD-EPI 2021: 100 ML/MIN/1.73M2 (ref 60–?)
EOSINOPHIL # BLD AUTO: 0.12 X10(3) UL (ref 0–0.7)
EOSINOPHIL NFR BLD AUTO: 1.6 %
ERYTHROCYTE [DISTWIDTH] IN BLOOD BY AUTOMATED COUNT: 12.3 %
GLOBULIN PLAS-MCNC: 2.4 G/DL (ref 2–3.5)
GLUCOSE BLD-MCNC: 98 MG/DL (ref 70–99)
HCT VFR BLD AUTO: 41.7 %
HGB BLD-MCNC: 14.2 G/DL
IMM GRANULOCYTES # BLD AUTO: 0.02 X10(3) UL (ref 0–1)
IMM GRANULOCYTES NFR BLD: 0.3 %
LYMPHOCYTES # BLD AUTO: 1.65 X10(3) UL (ref 1–4)
LYMPHOCYTES NFR BLD AUTO: 22.6 %
MCH RBC QN AUTO: 31.3 PG (ref 26–34)
MCHC RBC AUTO-ENTMCNC: 34.1 G/DL (ref 31–37)
MCV RBC AUTO: 92.1 FL
MONOCYTES # BLD AUTO: 0.5 X10(3) UL (ref 0.1–1)
MONOCYTES NFR BLD AUTO: 6.9 %
NEUTROPHILS # BLD AUTO: 4.98 X10 (3) UL (ref 1.5–7.7)
NEUTROPHILS # BLD AUTO: 4.98 X10(3) UL (ref 1.5–7.7)
NEUTROPHILS NFR BLD AUTO: 68.3 %
OSMOLALITY SERPL CALC.SUM OF ELEC: 296 MOSM/KG (ref 275–295)
PLATELET # BLD AUTO: 253 10(3)UL (ref 150–450)
POTASSIUM SERPL-SCNC: 4.1 MMOL/L (ref 3.5–5.1)
PROT SERPL-MCNC: 7.6 G/DL (ref 5.7–8.2)
RBC # BLD AUTO: 4.53 X10(6)UL
SODIUM SERPL-SCNC: 142 MMOL/L (ref 136–145)
T4 FREE SERPL-MCNC: 1.2 NG/DL (ref 0.8–1.7)
TSI SER-ACNC: 0.82 UIU/ML (ref 0.55–4.78)
WBC # BLD AUTO: 7.3 X10(3) UL (ref 4–11)

## 2025-04-03 NOTE — PROGRESS NOTES
Cancer Center Progress Note    Patient Name: Mango Hernandez   YOB: 1970   Medical Record Number: [id number]   CSN: [id number]   Date of visit: 4/3/2025  Attending Oncology Physician:  Lacho Hodge    NOTE TO PATIENT:  The 21st Century Cures Act makes medical notes like these available to patients in the interest of transparency. Please be advised this is a medical document. Medical documents are intended to carry relevant information, facts as evident, and the clinical opinion of the practitioner. The medical note is intended as peer to peer communication and may appear blunt or direct. It is written in medical language and may contain abbreviations or verbiage that are unfamiliar.      Chief Complaint:  Chief Complaint   Patient presents with    Follow - Up          Oncologic History:  Mango Hernandez is a 54 year old male who on 05/14/2024 underwent shave biopsy of a lesion in the right superior upper back which showed nodular melanoma with following characteristics: ulceration, 3.8 mm depth, 5 mitoses/mm2, no microsatellites, no LVI, no neurotropism, no tumor regression.      PET/CT scan showed no evidence of metastatic disease.      On 06/06/2024 he underwent wide local excision and sentinel lymph node biopsy. Pathology showed no residual melanoma at the primary site and 1 sentinel lymph node with metastasis. Patient was staged as D6pP8hT5.     On 06/20/2024 he began adjuvant therapy with nivolumab.     Interval Events:  54 year old  patient of Dr. Hodge's  who presents for scheduled follow up and treatment.  No cough, SOB, CP.  No diarrhea, abdominal pain, melena, or hematochezia.  No rash or pruritus.  No joint or muscle pain.  Energy is good.  No mouth sores.      Allergies:  Allergies[1]    Social History     Socioeconomic History    Marital status:    Tobacco Use    Smoking status: Former     Types: Cigarettes    Smokeless tobacco: Never    Tobacco comments:     Quit 2005    Vaping Use    Vaping status: Former   Substance and Sexual Activity    Alcohol use: Not Currently     Comment: occasionally    Drug use: Yes     Types: Cannabis     Comment: 5-7 to sleep        Past Medical History:    Cardiomyopathy (HCC)    Congestive heart disease (HCC)    Dilated cardiomyopathy (HCC)    hx of pacemaker    High blood pressure    Hypertension    Knee pain    Lumbar pain    Visual impairment    Vitamin D deficiency        Past Surgical History:   Procedure Laterality Date    Colonoscopy  2022    Other surgical history Right     Wide excision with sentinel node (R upper back)    Pacemaker clinic, cardio (internal)      2005 and 2014. Removed 3/2023    Skin biopsy            Vital Signs:  Height: 180.6 cm (5' 11.1\") (04/03 0911)  Weight: 87.1 kg (192 lb) (04/03 0911)  BSA (Calculated - sq m): 2.07 sq meters (04/03 0911)  Pulse: 52 (04/03 0911)  BP: 125/81 (04/03 0911)  Temp: 97.1 °F (36.2 °C) (04/03 0911)  Do Not Use - Resp Rate: --  SpO2: 99 % (04/03 0911)        Medications:    Current Outpatient Medications:     famotidine 20 MG Oral Tab, Take 1 tablet (20 mg total) by mouth 2 (two) times daily., Disp: , Rfl:     carvedilol 25 MG Oral Tab, Take 1 tablet (25 mg total) by mouth 2 (two) times daily with meals., Disp: , Rfl:     lisinopril 10 MG Oral Tab, Take 1 tablet (10 mg total) by mouth daily., Disp: , Rfl:     spironolactone 25 MG Oral Tab, Take 1 tablet (25 mg total) by mouth daily., Disp: , Rfl:     Review of Systems:   As in HPI    Physical Examination:  General: Awake, alert, oriented x3, no acute distress.    HEENT:  Anicteric, conjunctivae and sclerae clear, no sinus tenderness, no oropharyngeal lesion/thrush, mucous membranes are moist.  Neck:  Supple, no tenderness, no masses or adenopathy  Lungs:  Clear to auscultation bilaterally  CV:  Regular rate and rhythm  Abdomen:  Non-distended, normoactive bowel sounds, soft,nontender, no hepatosplenomegaly.  Extremities:  No edema, no  tenderness  Neuro:  CN 2-12 intact    ECO    Labs:  Lab Results   Component Value Date    WBC 7.3 2025    HGB 14.2 2025    HCT 41.7 2025    .0 2025    CREATSERUM 0.92 2025    BUN 18 2025     2025    K 4.1 2025     2025    CO2 28.0 2025    GLU 98 2025    CA 10.3 2025    ALB 5.2 2025    ALKPHO 44 2025    BILT 0.7 2025    TP 7.6 2025    AST 24 2025    ALT 19 2025    T4F 1.2 2025    TSH 0.817 2025             Impression/Plan:  Malignant melanoma of right upper back: Patient is staged as [id number]. In 2024, he began adjuvant systemic therapy with nivolumab.            Proceed with cycle 11 of nivolumab without modification. Continue regular follow up with dermatology. CT imaging scheduled for next week.       Emotional Well Being:  I have assessed the patient's emotional well-being and any concerns about anxiety or depression.  No acute psychosocial intervention required at this time.    Patient will continue to receive longitudinal care at the [facility] for the complex care required for the cancer diagnosis including the expected complications related to anticancer therapy.    Risk level:  High-melanoma on cancer therapy, requiring intervention and close monitoring.    LOLA Bonilla  [facility]  [facility] [place]         [1]   Allergies  Allergen Reactions    Simvastatin OTHER (SEE COMMENTS)     Statin myopathy

## 2025-04-03 NOTE — PROGRESS NOTES
Pt here for C11D1 Drug(s)opdivo.  Arrives Ambulating independently, accompanied by Self and Spouse     Patient was evaluated today by KATINA.    Oral medications included in this regimen:  no    Patient confirms comprehension of cancer treatment schedule:  yes    Pregnancy screening:  Not applicable    Modifications in dose or schedule:  No    Medications appearance and physical integrity checked by RN: yes.    Chemotherapy IV pump settings verified by 2 RNs:  No due to targeted therapy IV administration.  Frequency of blood return and site check throughout administration: Prior to administration     Infusion/treatment outcome:  patient tolerated treatment without incident    Education Record    Learner:  Patient and Spouse  Barriers / Limitations:  None  Method:  Brief focused and Discussion  Education / instructions given:  poc  Outcome:  Shows understanding    Discharged Home, Ambulating independently, accompanied by:Self and Spouse    Patient/family verbalized understanding of future appointments: by MyChart messaging

## 2025-04-11 ENCOUNTER — HOSPITAL ENCOUNTER (OUTPATIENT)
Dept: CT IMAGING | Facility: HOSPITAL | Age: 55
Discharge: HOME | End: 2025-04-11
Attending: SPECIALIST
Payer: COMMERCIAL

## 2025-04-11 DIAGNOSIS — C43.59 MALIGNANT MELANOMA OF UPPER BACK (HCC): ICD-10-CM

## 2025-04-11 PROCEDURE — 74177 CT ABD & PELVIS W/CONTRAST: CPT | Performed by: SPECIALIST

## 2025-04-11 PROCEDURE — 71260 CT THORAX DX C+: CPT | Performed by: SPECIALIST

## 2025-05-01 ENCOUNTER — NURSE ONLY (OUTPATIENT)
Age: 55
End: 2025-05-01
Attending: SPECIALIST
Payer: COMMERCIAL

## 2025-05-01 ENCOUNTER — OFFICE VISIT (OUTPATIENT)
Age: 55
End: 2025-05-01
Attending: SPECIALIST
Payer: COMMERCIAL

## 2025-05-01 VITALS
HEIGHT: 71.1 IN | DIASTOLIC BLOOD PRESSURE: 79 MMHG | SYSTOLIC BLOOD PRESSURE: 121 MMHG | OXYGEN SATURATION: 98 % | RESPIRATION RATE: 16 BRPM | BODY MASS INDEX: 27.03 KG/M2 | HEART RATE: 63 BPM | WEIGHT: 195.19 LBS | TEMPERATURE: 98 F

## 2025-05-01 DIAGNOSIS — C77.3 SECONDARY MALIGNANT NEOPLASM OF AXILLARY LYMPH NODES (HCC): ICD-10-CM

## 2025-05-01 DIAGNOSIS — Z51.12 ENCOUNTER FOR ANTINEOPLASTIC IMMUNOTHERAPY: ICD-10-CM

## 2025-05-01 DIAGNOSIS — C43.59 MALIGNANT MELANOMA OF UPPER BACK (HCC): Primary | ICD-10-CM

## 2025-05-01 DIAGNOSIS — Z51.11 CHEMOTHERAPY MANAGEMENT, ENCOUNTER FOR: ICD-10-CM

## 2025-05-01 LAB
ALBUMIN SERPL-MCNC: 4.8 G/DL (ref 3.2–4.8)
ALBUMIN/GLOB SERPL: 2.3 {RATIO} (ref 1–2)
ALP LIVER SERPL-CCNC: 39 U/L (ref 45–117)
ALT SERPL-CCNC: 19 U/L (ref 10–49)
ANION GAP SERPL CALC-SCNC: 5 MMOL/L (ref 0–18)
AST SERPL-CCNC: 23 U/L (ref ?–34)
BASOPHILS # BLD AUTO: 0.04 X10(3) UL (ref 0–0.2)
BASOPHILS NFR BLD AUTO: 0.5 %
BILIRUB SERPL-MCNC: 0.4 MG/DL (ref 0.3–1.2)
BUN BLD-MCNC: 21 MG/DL (ref 9–23)
CALCIUM BLD-MCNC: 9.9 MG/DL (ref 8.7–10.6)
CHLORIDE SERPL-SCNC: 104 MMOL/L (ref 98–112)
CO2 SERPL-SCNC: 27 MMOL/L (ref 21–32)
CREAT BLD-MCNC: 0.83 MG/DL (ref 0.7–1.3)
EGFRCR SERPLBLD CKD-EPI 2021: 105 ML/MIN/1.73M2 (ref 60–?)
EOSINOPHIL # BLD AUTO: 0.06 X10(3) UL (ref 0–0.7)
EOSINOPHIL NFR BLD AUTO: 0.8 %
ERYTHROCYTE [DISTWIDTH] IN BLOOD BY AUTOMATED COUNT: 12.2 %
GLOBULIN PLAS-MCNC: 2.1 G/DL (ref 2–3.5)
GLUCOSE BLD-MCNC: 95 MG/DL (ref 70–99)
HCT VFR BLD AUTO: 39.6 % (ref 39–53)
HGB BLD-MCNC: 13.2 G/DL (ref 13–17.5)
IMM GRANULOCYTES # BLD AUTO: 0.03 X10(3) UL (ref 0–1)
IMM GRANULOCYTES NFR BLD: 0.4 %
LYMPHOCYTES # BLD AUTO: 1.37 X10(3) UL (ref 1–4)
LYMPHOCYTES NFR BLD AUTO: 17.6 %
MCH RBC QN AUTO: 30.6 PG (ref 26–34)
MCHC RBC AUTO-ENTMCNC: 33.3 G/DL (ref 31–37)
MCV RBC AUTO: 91.7 FL (ref 80–100)
MONOCYTES # BLD AUTO: 0.64 X10(3) UL (ref 0.1–1)
MONOCYTES NFR BLD AUTO: 8.2 %
NEUTROPHILS # BLD AUTO: 5.65 X10 (3) UL (ref 1.5–7.7)
NEUTROPHILS # BLD AUTO: 5.65 X10(3) UL (ref 1.5–7.7)
NEUTROPHILS NFR BLD AUTO: 72.5 %
OSMOLALITY SERPL CALC.SUM OF ELEC: 285 MOSM/KG (ref 275–295)
PLATELET # BLD AUTO: 231 10(3)UL (ref 150–450)
POTASSIUM SERPL-SCNC: 4.3 MMOL/L (ref 3.5–5.1)
PROT SERPL-MCNC: 6.9 G/DL (ref 5.7–8.2)
RBC # BLD AUTO: 4.32 X10(6)UL (ref 4.3–5.7)
SODIUM SERPL-SCNC: 136 MMOL/L (ref 136–145)
T4 FREE SERPL-MCNC: 1.2 NG/DL (ref 0.8–1.7)
WBC # BLD AUTO: 7.8 X10(3) UL (ref 4–11)

## 2025-05-01 NOTE — PROGRESS NOTES
Education Record    Learner:  Patient/ spouse    Disease / Diagnosis: melanoma  OTV D1C12 Nivolumab  Barriers / Limitations:  None   Comments:    Method:  Discussion   Comments:    General Topics:  Plan of care reviewed   Comments:    Outcome:  Shows understanding   Comments:   Pt feeling well.   Reports good energy and appetite.   Occ nausea in the morning, or if he is hungry   Denies constipation or diarrhea, no pain. No cough orSOB>

## 2025-05-01 NOTE — PROGRESS NOTES
Pt here for C12D1 Drug(s)Opdivo.  Arrives Ambulating independently, accompanied by Spouse     Patient was evaluated today by MD.    Oral medications included in this regimen:  no    Patient confirms comprehension of cancer treatment schedule:  yes    Pregnancy screening:  Not applicable    Modifications in dose or schedule:  No    Medications appearance and physical integrity checked by RN: yes.    Chemotherapy IV pump settings verified by 2 RNs:  No due to targeted therapy IV administration.  Frequency of blood return and site check throughout administration: Prior to administration and At completion of therapy     Infusion/treatment outcome:  patient tolerated treatment without incident    Education Record    Learner:  Patient and Spouse  Barriers / Limitations:  None  Method:  Brief focused and Discussion  Education / instructions given:  plan of care, medications, side effects  Outcome:  Shows understanding    Discharged Home, Ambulating independently, accompanied by:Spouse    Patient/family verbalized understanding of future appointments: by MyChart messaging

## 2025-05-01 NOTE — PROGRESS NOTES
[facility]ematology Oncology Group Progress Note       Patient Name: Mango Hernandez   YOB: 1970  Medical Record Number: [id number]  Attending Physician: aLcho Hodge M.D.      The 21st Century Cures Act makes medical notes like these available to patients in the interest of transparency. Please be advised this is a medical document. Medical documents are intended to carry relevant information, facts as evident, and the clinical opinion of the practitioner. The medical note is intended as peer to peer communication and may appear blunt or direct. It is written in medical language and may contain abbreviations or verbiage that are unfamiliar.      Date of Visit: 5/1/2025       Chief Complaint  Malignant melanoma of superior right back - follow up and treatment.    Oncologic History  Mango Hernandez is a 54 year old male who on 05/14/2024 underwent shave biopsy of a lesion in the right superior upper back which showed nodular melanoma with following characteristics: ulceration, 3.8 mm depth, 5 mitoses/mm2, no microsatellites, no LVI, no neurotropism, no tumor regression.     PET/CT scan showed no evidence of metastatic disease.     On 06/06/2024 he underwent wide local excision and sentinel lymph node biopsy. Pathology showed no residual melanoma at the primary site and 1 sentinel lymph node with metastasis. Patient was staged as [id number].    On 06/20/2024 he began adjuvant therapy with nivolumab.     History of Present Illness  Patient returns for scheduled follow up and treatment. He has no complaints.     Performance Status   Karnofsky 100% - Normal, no complaints.     Past Medical History (historical data, reviewed by physician)   Malignant melanoma (as above); dilated cardiomyopathy; hypertension.     Past Surgical History (historical data, reviewed by physician)   Wide local excision and sentinel lymph node biopsy for melanoma (as above); pacemaker placed and removed.     Family  History (historical data, reviewed by physician)   Paternal grandmother with lung cancer; paternal grandfather with ?bone cancer; paternal aunt with cancer of unknown type; maternal grandmother with \"abdominal\" cancer.     Social History (historical data, reviewed by physician)   Previous tobacco use.     Current Medications   famotidine 20 MG Oral Tab Take 1 tablet (20 mg total) by mouth in the morning and 1 tablet (20 mg total) before bedtime.      carvedilol 25 MG Oral Tab Take 1 tablet (25 mg total) by mouth in the morning and 1 tablet (25 mg total) in the evening. Take with meals.      lisinopril 10 MG Oral Tab Take 1 tablet (10 mg total) by mouth in the morning.      spironolactone 25 MG Oral Tab Take 1 tablet (25 mg total) by mouth in the morning.       Allergies   Mr. Hernandez is allergic to simvastatin.     Vital Signs   /79 (BP Location: Left arm, Patient Position: Sitting, Cuff Size: adult)   Pulse 63   Temp 98 °F (36.7 °C) (Temporal)   Resp 16   Ht 1.806 m (5' 11.1\")   Wt 88.5 kg (195 lb 3.2 oz)   SpO2 98%   BMI 27.15 kg/m²     Physical Examination   Constitutional      Well developed, well nourished. No apparent distress.   Head                   Normocephalic and atraumatic.  Eyes   Conjunctiva clear; sclera anicteric.  ENMT                 External nose normal; external ears normal.  Neck                   Supple, without masses.  Lymphatic  No cervical, supraclavicular, axillary lymphadenopathy.  Respiratory          Normal effort; no respiratory distress; lungs clear to auscultation bilaterally.  Cardiovascular     Regular rate and rhythm; normal S1S2.  Abdomen            Non-tender; non-distended; no masses; no fluid wave; no hepatosplenomegaly.  Extremities          No lower extremity edema.  Neurologic           Motor and sensory grossly intact.  Psychiatric          Mood and affect appropriate.    Laboratory   Recent Results (from the past week)   CBC W Differential W Platelet     Collection Time: 25  9:25 AM   Result Value Ref Range    WBC 7.8 4.0 - 11.0 x10(3) uL    RBC 4.32 4.30 - 5.70 x10(6)uL    HGB 13.2 13.0 - 17.5 g/dL    HCT 39.6 39.0 - 53.0 %    .0 150.0 - 450.0 10(3)uL    MCV 91.7 80.0 - 100.0 fL    MCH 30.6 26.0 - 34.0 pg    MCHC 33.3 31.0 - 37.0 g/dL    RDW 12.2 %    Neutrophil Absolute Prelim 5.65 1.50 - 7.70 x10 (3) uL    Neutrophil Absolute 5.65 1.50 - 7.70 x10(3) uL    Lymphocyte Absolute 1.37 1.00 - 4.00 x10(3) uL    Monocyte Absolute 0.64 0.10 - 1.00 x10(3) uL    Eosinophil Absolute 0.06 0.00 - 0.70 x10(3) uL    Basophil Absolute 0.04 0.00 - 0.20 x10(3) uL    Immature Granulocyte Absolute 0.03 0.00 - 1.00 x10(3) uL    Neutrophil % 72.5 %    Lymphocyte % 17.6 %    Monocyte % 8.2 %    Eosinophil % 0.8 %    Basophil % 0.5 %    Immature Granulocyte % 0.4 %   Comp Metabolic Panel (14)    Collection Time: 25  9:25 AM   Result Value Ref Range    Glucose 95 70 - 99 mg/dL    Sodium 136 136 - 145 mmol/L    Potassium 4.3 3.5 - 5.1 mmol/L    Chloride 104 98 - 112 mmol/L    CO2 27.0 21.0 - 32.0 mmol/L    Anion Gap 5 0 - 18 mmol/L    BUN 21 9 - 23 mg/dL    Creatinine 0.83 0.70 - 1.30 mg/dL    Calcium, Total 9.9 8.7 - 10.6 mg/dL    Calculated Osmolality 285 275 - 295 mOsm/kg    eGFR-Cr 105 >=60 mL/min/1.73m2    AST 23 <34 U/L    ALT 19 10 - 49 U/L    Alkaline Phosphatase 39 (L) 45 - 117 U/L    Bilirubin, Total 0.4 0.3 - 1.2 mg/dL    Total Protein 6.9 5.7 - 8.2 g/dL    Albumin 4.8 3.2 - 4.8 g/dL    Globulin  2.1 2.0 - 3.5 g/dL    A/G Ratio 2.3 (H) 1.0 - 2.0    Patient Fasting for CMP? Patient not present    T4 FREE [E]    Collection Time: 25  9:25 AM   Result Value Ref Range    Free T4 1.2 0.8 - 1.7 ng/dL     Radiology  [facility]  Department of Radiology  [address] [place] [address] [address] [place] [id number] [phone]      Name: Mango Hernandez Dr: Lacho Hodge MD  : 1970    Age/Sex: 54 year old Male  Pt.  Phone: [phone]  Exam Date: 04/11/2025  Procedure: CT CHEST+ABDOMEN+PELVIS(ALL CNTRST ONLY)(ZIV=45076/19440)   -----------------------------------------------------------------------------------------------------------------------------------------------                  Lacho Hodge MD  [address] [place] [address] [facility] [id number]      Interpretation   PROCEDURE:  CT CHEST+ABDOMEN+PELVIS(ALL CNTRST ONLY)(CPT=71260/98844)     COMPARISON:  [facility] , CT, CT CHEST+ABDOMEN+PELVIS(ALL CNTRST ONLY)(CPT=71260/80472), 12/07/2024, 9:05 AM.     INDICATIONS:  C43.59 Malignant melanoma of upper back (HCC)     TECHNIQUE:  IV contrast-enhanced scanning through the chest, abdomen, and pelvis was performed.  Dose reduction techniques were used. Dose information is transmitted to the ACR (American College of Radiology) NRDR (National Radiology Data Registry) which   includes the Dose Index Registry.     PATIENT STATED HISTORY:(As transcribed by Technologist)  history of malignant melanoma of upper back.      CONTRAST USED:  100cc of Isovue 370     FINDINGS:       CHEST:    LUNGS:  Stable 2 mm right upper lobe lung nodule series 3, image 65.   MEDIASTINUM:  No mass or adenopathy.    SANTY:  No mass or adenopathy.    CARDIAC:  No enlargement, pericardial thickening, or significant coronary artery calcification.  PLEURA:  No mass or effusion.    CHEST WALL:  Subcutaneous nodularity right back near the body of the scapula measures axial dimensions of 2.6 x 1 cm (previously 4.4 x 1.6 cm).  This is most likely postoperative seroma and granulation tissue.  There are surgical clips in the right   axilla.  AORTA:  No aneurysm or dissection.    VASCULATURE:  No visible pulmonary arterial thrombus or attenuation.       ABDOMEN/PELVIS:  LIVER:  No enlargement, atrophy, abnormal density, or significant focal lesion.    BILIARY:  No visible dilatation or calcification.    PANCREAS:  No lesion, fluid  collection, ductal dilatation, or atrophy.    SPLEEN:  No enlargement or focal lesion.    KIDNEYS:  No mass, obstruction, or calcification.    ADRENALS:  No mass or enlargement.    AORTA:  No aneurysm or dissection.    RETROPERITONEUM:  No mass or adenopathy.    BOWEL/MESENTERY:  There is diverticulosis of the colon.  There is no CT evidence of diverticulitis.  Enteric structures are otherwise unremarkable.  ABDOMINAL WALL:  Midline epigastric region hernia contains mesenteric fat and a small amount of fluid density.  This is just above the level of the umbilicus.  URINARY BLADDER:  No visible focal wall thickening, lesion, or calculus.    PELVIC NODES:  No adenopathy.    PELVIC ORGANS:  There are dystrophic calcifications in the prostate.  BONES:  There is bilateral spondylolysis at L5 without significant anterolisthesis.                   =====  CONCLUSION:    1. Stable right upper lobe lung nodule.  2. Stable postoperative findings superficial right posterior chest.  3. Midline hernia just above the umbilicus containing fat and fluid density.  This is retrospectively stable.  4. No specific evidence of metastatic disease in the chest, abdomen or pelvis.             LOCATION:  [place]           Dictated by (CST): Marquis Granda MD on 4/11/2025 at 9:06 AM       Finalized by (CST): Marquis Granda MD on 4/11/2025 at 9:15 AM        Impression and Plan   1.   Malignant melanoma of right upper back: Patient is staged as [id number]. In 06/2024, he began adjuvant systemic therapy with nivolumab.           Proceed with cycle 12 of nivolumab without modification. Continue regular follow up with dermatology.    Planned Follow Up   Patient will return for follow up and treatment in 4 weeks.    Electronically Signed by:     Lacho Hodge M.D.  System Medical Director, Oncology Services  [facility]

## 2025-05-28 DIAGNOSIS — C43.59 MALIGNANT MELANOMA OF UPPER BACK (HCC): Primary | ICD-10-CM

## 2025-05-29 ENCOUNTER — OFFICE VISIT (OUTPATIENT)
Age: 55
End: 2025-05-29
Attending: SPECIALIST
Payer: COMMERCIAL

## 2025-05-29 ENCOUNTER — NURSE ONLY (OUTPATIENT)
Age: 55
End: 2025-05-29
Attending: SPECIALIST
Payer: COMMERCIAL

## 2025-05-29 VITALS
TEMPERATURE: 98 F | SYSTOLIC BLOOD PRESSURE: 135 MMHG | DIASTOLIC BLOOD PRESSURE: 77 MMHG | RESPIRATION RATE: 18 BRPM | HEART RATE: 55 BPM | BODY MASS INDEX: 27.83 KG/M2 | WEIGHT: 201 LBS | OXYGEN SATURATION: 100 % | HEIGHT: 71.1 IN

## 2025-05-29 DIAGNOSIS — C43.59 MALIGNANT MELANOMA OF UPPER BACK (HCC): ICD-10-CM

## 2025-05-29 DIAGNOSIS — C77.3 SECONDARY MALIGNANT NEOPLASM OF AXILLARY LYMPH NODES (HCC): Primary | ICD-10-CM

## 2025-05-29 DIAGNOSIS — Z51.12 ENCOUNTER FOR ANTINEOPLASTIC IMMUNOTHERAPY: ICD-10-CM

## 2025-05-29 DIAGNOSIS — C43.59 MALIGNANT MELANOMA OF UPPER BACK (HCC): Primary | ICD-10-CM

## 2025-05-29 LAB
ALBUMIN SERPL-MCNC: 4.5 G/DL (ref 3.2–4.8)
ALBUMIN/GLOB SERPL: 2.3 {RATIO} (ref 1–2)
ALP LIVER SERPL-CCNC: 37 U/L (ref 45–117)
ALT SERPL-CCNC: 22 U/L (ref 10–49)
ANION GAP SERPL CALC-SCNC: 8 MMOL/L (ref 0–18)
AST SERPL-CCNC: 24 U/L (ref ?–34)
BASOPHILS # BLD AUTO: 0.04 X10(3) UL (ref 0–0.2)
BASOPHILS NFR BLD AUTO: 0.7 %
BILIRUB SERPL-MCNC: 0.4 MG/DL (ref 0.3–1.2)
BUN BLD-MCNC: 13 MG/DL (ref 9–23)
CALCIUM BLD-MCNC: 9.5 MG/DL (ref 8.7–10.6)
CHLORIDE SERPL-SCNC: 107 MMOL/L (ref 98–112)
CO2 SERPL-SCNC: 26 MMOL/L (ref 21–32)
CREAT BLD-MCNC: 0.83 MG/DL (ref 0.7–1.3)
EGFRCR SERPLBLD CKD-EPI 2021: 105 ML/MIN/1.73M2 (ref 60–?)
EOSINOPHIL # BLD AUTO: 0.1 X10(3) UL (ref 0–0.7)
EOSINOPHIL NFR BLD AUTO: 1.7 %
ERYTHROCYTE [DISTWIDTH] IN BLOOD BY AUTOMATED COUNT: 12.2 %
GLOBULIN PLAS-MCNC: 2 G/DL (ref 2–3.5)
GLUCOSE BLD-MCNC: 81 MG/DL (ref 70–99)
HCT VFR BLD AUTO: 39 % (ref 39–53)
HGB BLD-MCNC: 13.1 G/DL (ref 13–17.5)
IMM GRANULOCYTES # BLD AUTO: 0.02 X10(3) UL (ref 0–1)
IMM GRANULOCYTES NFR BLD: 0.3 %
LYMPHOCYTES # BLD AUTO: 1.2 X10(3) UL (ref 1–4)
LYMPHOCYTES NFR BLD AUTO: 19.8 %
MCH RBC QN AUTO: 31 PG (ref 26–34)
MCHC RBC AUTO-ENTMCNC: 33.6 G/DL (ref 31–37)
MCV RBC AUTO: 92.2 FL (ref 80–100)
MONOCYTES # BLD AUTO: 0.38 X10(3) UL (ref 0.1–1)
MONOCYTES NFR BLD AUTO: 6.3 %
NEUTROPHILS # BLD AUTO: 4.32 X10 (3) UL (ref 1.5–7.7)
NEUTROPHILS # BLD AUTO: 4.32 X10(3) UL (ref 1.5–7.7)
NEUTROPHILS NFR BLD AUTO: 71.2 %
OSMOLALITY SERPL CALC.SUM OF ELEC: 291 MOSM/KG (ref 275–295)
PLATELET # BLD AUTO: 205 10(3)UL (ref 150–450)
POTASSIUM SERPL-SCNC: 3.8 MMOL/L (ref 3.5–5.1)
PROT SERPL-MCNC: 6.5 G/DL (ref 5.7–8.2)
RBC # BLD AUTO: 4.23 X10(6)UL (ref 4.3–5.7)
SODIUM SERPL-SCNC: 141 MMOL/L (ref 136–145)
T4 FREE SERPL-MCNC: 1.1 NG/DL (ref 0.8–1.7)
WBC # BLD AUTO: 6.1 X10(3) UL (ref 4–11)

## 2025-05-29 RX ORDER — TRIAMCINOLONE ACETONIDE 1 MG/G
OINTMENT TOPICAL
COMMUNITY
Start: 2025-05-07

## 2025-05-29 NOTE — PROGRESS NOTES
Pt here for C13D1 Drug(s)Opdivo.  Arrives Ambulating independently, accompanied by Spouse     Patient was evaluated today by MD.    Oral medications included in this regimen:  no    Patient confirms comprehension of cancer treatment schedule:  yes    Pregnancy screening:  Not applicable    Modifications in dose or schedule:  No    Medications appearance and physical integrity checked by RN: yes.    Chemotherapy IV pump settings verified by 2 RNs:  No due to targeted therapy IV administration.  Frequency of blood return and site check throughout administration: Prior to administration and At completion of therapy     Infusion/treatment outcome:  patient tolerated treatment without incident    Education Record    Learner:  Patient and Spouse  Barriers / Limitations:  None  Method:  Brief focused and Reinforcement  Education / instructions given:  Plan of care reviewed.  Outcome:  Shows understanding    Discharged Home, Ambulating independently, accompanied by:Spouse    Patient/family verbalized understanding of future appointments: by MyChart messaging

## 2025-05-29 NOTE — PROGRESS NOTES
[facility] Hematology Oncology Group Progress Note       Patient Name: Mango Hernandez   YOB: 1970  Medical Record Number: [id number]  Attending Physician: Lacho Hodge M.D.      The 21st Century Cures Act makes medical notes like these available to patients in the interest of transparency. Please be advised this is a medical document. Medical documents are intended to carry relevant information, facts as evident, and the clinical opinion of the practitioner. The medical note is intended as peer to peer communication and may appear blunt or direct. It is written in medical language and may contain abbreviations or verbiage that are unfamiliar.      Date of Visit: 5/29/2025      Chief Complaint  Malignant melanoma of superior right back - follow up and treatment.    Oncologic History  Mango Hernandez is a 54 year old male who on 05/14/2024 underwent shave biopsy of a lesion in the right superior upper back which showed nodular melanoma with following characteristics: ulceration, 3.8 mm depth, 5 mitoses/mm2, no microsatellites, no LVI, no neurotropism, no tumor regression.     PET/CT scan showed no evidence of metastatic disease.     On 06/06/2024 he underwent wide local excision and sentinel lymph node biopsy. Pathology showed no residual melanoma at the primary site and 1 sentinel lymph node with metastasis. Patient was staged as [id number].    On 06/20/2024 he began adjuvant therapy with nivolumab.     History of Present Illness  Patient returns for scheduled follow up and treatment. He has no complaints.     Performance Status   Karnofsky 100% - Normal, no complaints.     Past Medical History (historical data, reviewed by physician)   Malignant melanoma (as above); dilated cardiomyopathy; hypertension.     Past Surgical History (historical data, reviewed by physician)   Wide local excision and sentinel lymph node biopsy for melanoma (as above); pacemaker placed and removed.     Family  History (historical data, reviewed by physician)   Paternal grandmother with lung cancer; paternal grandfather with ?bone cancer; paternal aunt with cancer of unknown type; maternal grandmother with \"abdominal\" cancer.     Social History (historical data, reviewed by physician)   Previous tobacco use.     Current Medications   triamcinolone 0.1 % External Ointment APPLY TO AFFECTED AREA(S) OF ECZEMA TWO TIMES A DAY FOR 2 WEEKS- AVOID FACE, AXILLA, AND GROIN       Allergies   Mr. Hernandez is allergic to simvastatin.     Vital Signs   Height: 180.6 cm (5' 11.1\") (05/29 0941)  Weight: 91.2 kg (201 lb) (05/29 0941)  BSA (Calculated - sq m): 2.12 sq meters (05/29 0941)  Pulse: 55 (05/29 0941)  BP: 135/77 (05/29 0941)  Temp: 98 °F (36.7 °C) (05/29 0941)  Do Not Use - Resp Rate: --  SpO2: 100 % (05/29 0941)    Physical Examination   Constitutional      Well developed, well nourished. No apparent distress.   Head                   Normocephalic and atraumatic.  Eyes   Conjunctiva clear; sclera anicteric.  ENMT                 External nose normal; external ears normal.  Neck                   Supple, without masses.  Lymphatic  No cervical, supraclavicular, axillary lymphadenopathy.  Respiratory          Normal effort; no respiratory distress; lungs clear to auscultation bilaterally.  Cardiovascular     Regular rate and rhythm; normal S1S2.  Abdomen            Non-tender; non-distended; no masses; no fluid wave; no hepatosplenomegaly.  Extremities          No lower extremity edema.  Neurologic           Motor and sensory grossly intact.  Psychiatric          Mood and affect appropriate.    Laboratory   Recent Results (from the past week)   CBC W Differential W Platelet    Collection Time: 05/29/25  9:37 AM   Result Value Ref Range    WBC 6.1 4.0 - 11.0 x10(3) uL    RBC 4.23 (L) 4.30 - 5.70 x10(6)uL    HGB 13.1 13.0 - 17.5 g/dL    HCT 39.0 39.0 - 53.0 %    .0 150.0 - 450.0 10(3)uL    MCV 92.2 80.0 - 100.0 fL    MCH  31.0 26.0 - 34.0 pg    MCHC 33.6 31.0 - 37.0 g/dL    RDW 12.2 %    Neutrophil Absolute Prelim 4.32 1.50 - 7.70 x10 (3) uL    Neutrophil Absolute 4.32 1.50 - 7.70 x10(3) uL    Lymphocyte Absolute 1.20 1.00 - 4.00 x10(3) uL    Monocyte Absolute 0.38 0.10 - 1.00 x10(3) uL    Eosinophil Absolute 0.10 0.00 - 0.70 x10(3) uL    Basophil Absolute 0.04 0.00 - 0.20 x10(3) uL    Immature Granulocyte Absolute 0.02 0.00 - 1.00 x10(3) uL    Neutrophil % 71.2 %    Lymphocyte % 19.8 %    Monocyte % 6.3 %    Eosinophil % 1.7 %    Basophil % 0.7 %    Immature Granulocyte % 0.3 %   Comp Metabolic Panel (14)    Collection Time: 05/29/25  9:37 AM   Result Value Ref Range    Glucose 81 70 - 99 mg/dL    Sodium 141 136 - 145 mmol/L    Potassium 3.8 3.5 - 5.1 mmol/L    Chloride 107 98 - 112 mmol/L    CO2 26.0 21.0 - 32.0 mmol/L    Anion Gap 8 0 - 18 mmol/L    BUN 13 9 - 23 mg/dL    Creatinine 0.83 0.70 - 1.30 mg/dL    Calcium, Total 9.5 8.7 - 10.6 mg/dL    Calculated Osmolality 291 275 - 295 mOsm/kg    eGFR-Cr 105 >=60 mL/min/1.73m2    AST 24 <34 U/L    ALT 22 10 - 49 U/L    Alkaline Phosphatase 37 (L) 45 - 117 U/L    Bilirubin, Total 0.4 0.3 - 1.2 mg/dL    Total Protein 6.5 5.7 - 8.2 g/dL    Albumin 4.5 3.2 - 4.8 g/dL    Globulin  2.0 2.0 - 3.5 g/dL    A/G Ratio 2.3 (H) 1.0 - 2.0    Patient Fasting for CMP? Patient not present    T4 FREE [E]    Collection Time: 05/29/25  9:37 AM   Result Value Ref Range    Free T4 1.1 0.8 - 1.7 ng/dL     Impression and Plan   1.   Malignant melanoma of right upper back: Patient is staged as [id number]. In 06/2024, he began adjuvant systemic therapy with nivolumab.           Proceed with cycle 13 of nivolumab without modification. This is the last planned cycle of therapy. Continue regular follow up with dermatology.          Patient will return for follow up in 3 months with imaging.     Planned Follow Up   Patient will return for follow up in 3 months.    Electronically Signed by:     Lacho Hodge,  M.D.  System Medical Director, Oncology Services  [facility]

## 2025-05-29 NOTE — PROGRESS NOTES
Education Record    Learner:  Patient/ spouse    Disease / Diagnosis: melanoma   OTV D1C13 Opdivo     Barriers / Limitations:  None   Comments:    Method:  Discussion   Comments:    General Topics:  Plan of care reviewed   Comments:    Outcome:  Shows understanding   Comments:   Pt feeling well.   Denies fatigue, N/V, constipation or diarrhea,   No abdominal pain.   Saw dermatology a few weeks ago and thinks next FU is in 90 days or 6 months.

## 2025-07-17 ENCOUNTER — APPOINTMENT (OUTPATIENT)
Facility: LOCATION | Age: 55
End: 2025-07-17
Attending: SPECIALIST
Payer: COMMERCIAL

## 2025-07-23 ENCOUNTER — TELEPHONE (OUTPATIENT)
Facility: LOCATION | Age: 55
End: 2025-07-23

## 2025-07-24 ENCOUNTER — OFFICE VISIT (OUTPATIENT)
Facility: LOCATION | Age: 55
End: 2025-07-24
Attending: SPECIALIST
Payer: COMMERCIAL

## 2025-07-24 DIAGNOSIS — Z08 ENCOUNTER FOR FOLLOW-UP EXAMINATION AFTER COMPLETED TREATMENT FOR MALIGNANT NEOPLASM: ICD-10-CM

## 2025-07-24 DIAGNOSIS — Z71.9 COUNSELING, UNSPECIFIED: ICD-10-CM

## 2025-07-24 DIAGNOSIS — C43.59 MALIGNANT MELANOMA OF UPPER BACK (HCC): Primary | ICD-10-CM

## 2025-07-24 NOTE — PROGRESS NOTES
I met with Mango for a Survivorship Clinic visit to provide a survivorship care plan (SCP) and information related to post-treatment care. He is a 54 year old male who on 4/14/24 saw a dermatologist due to lesion on his right upper back that had changed in appearance. He reported having a mole on his upper back since childhood and his wife had noticed it growing. On 5/14/24 he had a shave biopsy of the lesion in the right superior upper back and it showed nodular melanoma with following characteristics: ulceration, 3.8 mm depth, 5 mitoses/mm2, no microsatellites, no LVI, no neurotropism, no tumor regression. PET/CT scan showed no evidence of metastatic disease.      On 06/06/24 Dr. Ian Flor performed wide local excision and sentinel lymph node biopsy. Pathology showed no residual melanoma at the primary site and 1 sentinel lymph node with metastasis. Patient was staged as [id number], Stage IIIC. He was seen by Dr. Lacho Hodge and was recommended to have immunotherapy with Nivolumab. He received this for 13 cycles from 6/20/24-5/29/25. (See Oncology Treatment Summary SCP for details).      Mango reports doing well post-treatment. He tolerated immunotherapy well and has no reported issues. He lives a healthy lifestyle, does extensive daily exercise that includes aerobic and strength training most days of the week. He also walks at his job and gets [id number] steps/day. He and his wife have been eating healthy for years which resulted in significant weight loss (150 pounds over the years) and improved his cardiac issues. He has gained 20 pounds back and hopes to lose 10 pounds going forward. Current BMI is 27. He takes various supplements for exercise and nutrition.     He denies having anxiety or depression and has good support from his wife and friends. He has felt comfortable with his care and physicians which helped keep him calm prior to and during treatment.     Survivorship Care Plan and letter: Mango  was provided a hard copy of the SCP and a letter that outlined the purpose of the visit and plan.  We reviewed all elements of both documents. He is aware that a letter and SCP will be sent to his PCP, Dr. Kwadwo Noble.     Reviewed Cancer Surveillance and that Dr. Hodge and Dr. Flor will oversee this care.  He is due now to see Dr. Flor, should schedule right axillary US and was provided order/contact number. He will see Dr. Hodge and get labs on 8/28/25. He will get CT scan on 8/29/25. [facility] six months for skin exam and does monthly self skin exams.     Reviewed Schedule of other clinic visits with Primary Care and specialists: Dr. Noble will continue to manage all general health care recommended for age and gender including cancer screening tests.  Mnago is up-to-date with cancer screening. Ongoing medical comorbidities will continue to be managed by PCP and specialists (Cardiology, Dr. Annabel Rivas).      Reviewed concerning symptoms that he should report to any Provider.      Reviewed possible late and long-term effects related to the treatment that was received.       Reviewed common cancer survivor issues and resources available including nutrition, survivorship programs, psychosocial support.      Reviewed Lifestyle/behaviors that can affect ongoing health, including the risk for the cancer coming back or developing another cancer.  We reviewed importance of physical activity including aerobic, strength training and weight bearing exercise.  We reviewed a plant based diet.  We reviewed skin care and sun safety and he is careful using sunscreen and protective clothing. He drinks no alcohol and quit smoking in 2005.     The patient received a take-home folder that included the following survivorship resources:   -SCP and patient letter  -ASCO Survivorship Guide  -[facility] - nutrition and exercise classes, mind/body programs, education, support  groups  -[facility] [place] (virtual option)-education, support groups, special programs, nutrition and exercise classes, movement classes, mind/body programs, survivorship programs, individual counseling  -[facility]  -[employer] handout-nutrition, physical activity  -ACS Cancer Screening Guidelines  -Websites: American Cancer Society, [employer] for your Life, ACS Survivorship Network, National Coalition for Cancer Survivorship, Melanoma Research foundation, Impact Melanoma     The patient was given the opportunity to ask questions.  He had few questions, is doing so well and verbalized understanding of the information we discussed today.  My total time spent caring for the patient on the day of the encounter: 40 minutes of face to face counseling regarding survivorship education, review of care plan and additional resources. He was encouraged to call with any further questions.   LOLA Alvarez

## 2025-08-18 ENCOUNTER — HOSPITAL ENCOUNTER (OUTPATIENT)
Dept: ULTRASOUND IMAGING | Facility: HOSPITAL | Age: 55
Discharge: HOME | End: 2025-08-18

## 2025-08-18 DIAGNOSIS — C77.3 SECONDARY MALIGNANT NEOPLASM OF AXILLARY LYMPH NODES (HCC): ICD-10-CM

## 2025-08-18 DIAGNOSIS — C77.9 MALIGNANT MELANOMA METASTATIC TO LYMPH NODE (HCC): ICD-10-CM

## 2025-08-18 DIAGNOSIS — C43.59 MALIGNANT MELANOMA OF UPPER BACK (HCC): ICD-10-CM

## 2025-08-18 PROCEDURE — 76882 US LMTD JT/FCL EVL NVASC XTR: CPT

## 2025-08-21 DIAGNOSIS — C43.59 MALIGNANT MELANOMA OF UPPER BACK (HCC): Primary | ICD-10-CM

## 2025-08-21 DIAGNOSIS — C77.9 MALIGNANT MELANOMA METASTATIC TO LYMPH NODE (HCC): ICD-10-CM

## 2025-08-21 DIAGNOSIS — C77.3 SECONDARY MALIGNANT NEOPLASM OF AXILLARY LYMPH NODES (HCC): ICD-10-CM

## 2025-08-22 ENCOUNTER — TELEPHONE (OUTPATIENT)
Dept: SURGERY | Facility: CLINIC | Age: 55
End: 2025-08-22

## 2025-08-27 ENCOUNTER — TELEPHONE (OUTPATIENT)
Dept: RADIATION ONCOLOGY | Facility: HOSPITAL | Age: 55
End: 2025-08-27

## 2025-08-29 ENCOUNTER — HOSPITAL ENCOUNTER (OUTPATIENT)
Dept: CT IMAGING | Facility: HOSPITAL | Age: 55
Discharge: HOME | End: 2025-08-29
Attending: SPECIALIST

## 2025-08-29 DIAGNOSIS — C43.59 MALIGNANT MELANOMA OF UPPER BACK (HCC): ICD-10-CM

## 2025-08-29 LAB
CREAT BLD-MCNC: 0.9 MG/DL (ref 0.7–1.3)
EGFRCR SERPLBLD CKD-EPI 2021: 102 ML/MIN/1.73M2 (ref 60–?)

## 2025-08-29 PROCEDURE — 74177 CT ABD & PELVIS W/CONTRAST: CPT | Performed by: SPECIALIST

## 2025-08-29 PROCEDURE — 82565 ASSAY OF CREATININE: CPT

## 2025-08-29 PROCEDURE — 71260 CT THORAX DX C+: CPT | Performed by: SPECIALIST

## (undated) DEVICE — BANDAGE,GAUZE,BULKEE II,4.5"X4.1YD,STRL: Brand: MEDLINE

## (undated) DEVICE — E-Z CLEAN, NON-STICK, PTFE COATED, ELECTROSURGICAL NEEDLE ELECTRODE, MODIFIED EXTENDED INSULATION, 2.75 INCH (7 CM): Brand: MEGADYNE

## (undated) DEVICE — ATTAHJA VAC WITH 22MM CONNECTR

## (undated) DEVICE — SYRINGE MED 10ML LL TIP W/O SFTY DISP

## (undated) DEVICE — SUT MCRYL 4-0 18IN PS-2 ABSRB UD 19MM 3/8 CIR

## (undated) DEVICE — PAD SACRAL SPAN AID

## (undated) DEVICE — SUT PERMA- 2-0 30IN SH NABSRB BLK L26MM 1/

## (undated) DEVICE — COVER LT HNDL RIG FOR SUR CAM DISP

## (undated) DEVICE — SUTURE ETHBND XL 2-0 30IN NABSRB GRN 26MM SH 1/2 CIR

## (undated) DEVICE — PACK DRAPE HEAD/NECK STER

## (undated) DEVICE — CLIP APPLIER: Brand: PREMIUM SURGICLIP II

## (undated) DEVICE — ANTIBACTERIAL UNDYED BRAIDED (POLYGLACTIN 910), SYNTHETIC ABSORBABLE SUTURE: Brand: COATED VICRYL

## (undated) DEVICE — PENCIL ES BTTN SWCH W/ TIP HOLSTER E-Z CLN

## (undated) DEVICE — LAPAROTOMY SPONGE - RF AND X-RAY DETECTABLE PRE-WASHED: Brand: SITUATE

## (undated) DEVICE — SLEEVE COMPR MD KNEE LEN SGL USE KENDALL SCD

## (undated) DEVICE — STANDARD HYPODERMIC NEEDLE,POLYPROPYLENE HUB: Brand: MONOJECT

## (undated) DEVICE — SHEET, DRAPE, SPLIT, STERILE: Brand: MEDLINE

## (undated) DEVICE — YANKAUER,FLEXIBLE HANDLE,REGLR CAPACITY: Brand: MEDLINE INDUSTRIES, INC.

## (undated) DEVICE — PAD,NON-ADHERENT,3X8,STERILE,LF,1/PK: Brand: MEDLINE

## (undated) DEVICE — SHEET,DRAPE,40X58,STERILE: Brand: MEDLINE

## (undated) DEVICE — BLADE 24 SS SRG STRL

## (undated) DEVICE — ELECTRODE ES 2.75IN PTFE BLDE MOD E-Z CLN

## (undated) DEVICE — STERILE SYNTHETIC POLYISOPRENE POWDER-FREE SURGICAL GLOVES WITH HYDROGEL COATING, SMOOTH FINISH, STRAIGHT FINGER: Brand: PROTEXIS

## (undated) DEVICE — GLOVE SUR 6.5 SENSICARE PI PIP CRM PWD F

## (undated) DEVICE — SUT PROL 2-0 30IN SH NABSRB BLU L26MM 1/2 CIR

## (undated) DEVICE — CLIP LIG M BLU TI HRT SHP WIRE HORZ

## (undated) DEVICE — SUT VCRL 2-0 36IN CT-1 ABSRB UD L36MM 1/2 CIR

## (undated) DEVICE — PROXIMATE SKIN STAPLERS (35 WIDE) CONTAINS 35 STAINLESS STEEL STAPLES (FIXED HEAD): Brand: PROXIMATE

## (undated) DEVICE — SLING ORTH MED 15X8.5IN 32IN

## (undated) NOTE — Clinical Note
Doing well, nice echo, sees you 8/28 with labs, Ct 8/29, is overdue for Dr. Flor and he will schedule.Thanks referral.

## (undated) NOTE — LETTER
OUTSIDE TESTING RESULT REQUEST     IMPORTANT: FOR YOUR IMMEDIATE ATTENTION  Please FAX all test results listed below to: [phone]         * * * * If testing is NOT complete, arrange with patient A.S.A.P. * * * *      Patient Name: Mango Hernandez  Surgery Date: 2024  Medical Record: [id number]  CSN: [id number]  : 1970 - A: 53 y     Sex: male  Surgeon(s):  Ian Flor MD  Procedure: Wide excision melanoma right upper back with sentinel lymph node biopsy  Anesthesia Type: General     Surgeon: Ian Flor MD     The following Testing and Time Line are REQUIRED PER ANESTHESIA     EKG READ AND SIGNED WITHIN   90 days      Thank You,   Sent by:Izabella VICENTE

## (undated) NOTE — LETTER
Medical Clearance Request    José Rivas MD  [facility]  [address] [place] [address]  Via Fax: [phone]    The patient listed below is scheduled for surgery and needs the following pre-op labs and/or clearance prior to surgery.  The patient has been instructed to schedule an appointment with you for a pre-op physical.      Patient: Mango Hernandez  : 1970    Surgeon:  Dr. Ian Flor    Procedure: Wide excision melanoma right upper back with sentinel lymph node biopsy    Surgery Date:  24  Location:  [facility]    outpatient    [] Hematocrit/Hemogram [x] EKG     [] CBC    [] Chest X-Ray    [] CMP    [] PT/PTT    [] Urinalysis   [] MRSA Nasal Culture    [] Urinalysis with reflex  [] History and Physical    [] Urine pregnancy  [] Medical Clearance    [] Qualitative HCG (blood) [x] Cardiac Clearance      Please fax to fax number indicated above when completed.  Call [phone] with any questions.     Thank you for your prompt attention to these requirements.    [facility]